# Patient Record
Sex: MALE | Race: BLACK OR AFRICAN AMERICAN | NOT HISPANIC OR LATINO | Employment: STUDENT | URBAN - METROPOLITAN AREA
[De-identification: names, ages, dates, MRNs, and addresses within clinical notes are randomized per-mention and may not be internally consistent; named-entity substitution may affect disease eponyms.]

---

## 2017-04-13 ENCOUNTER — TELEPHONE (OUTPATIENT)
Dept: ORTHOPEDICS | Facility: CLINIC | Age: 22
End: 2017-04-13

## 2017-04-13 NOTE — TELEPHONE ENCOUNTER
----- Message from Eren Radford sent at 4/13/2017 10:49 AM CDT -----  Contact: Mrs. Navas  Please call regarding being worked into the schedule for an appt today if possible. Pt's mother states he fractured his ankle and is in a lot of pain. Please call back @ ..[phone] (home) Thank you/NH

## 2017-04-18 ENCOUNTER — HOSPITAL ENCOUNTER (OUTPATIENT)
Dept: RADIOLOGY | Facility: HOSPITAL | Age: 22
Discharge: HOME | End: 2017-04-18
Attending: ORTHOPAEDIC SURGERY
Payer: MEDICAID

## 2017-04-18 ENCOUNTER — OFFICE VISIT (OUTPATIENT)
Dept: ORTHOPEDICS | Facility: CLINIC | Age: 22
End: 2017-04-18
Payer: MEDICAID

## 2017-04-18 VITALS
WEIGHT: 145.94 LBS | BODY MASS INDEX: 20.43 KG/M2 | SYSTOLIC BLOOD PRESSURE: 139 MMHG | HEART RATE: 63 BPM | HEIGHT: 71 IN | DIASTOLIC BLOOD PRESSURE: 83 MMHG

## 2017-04-18 DIAGNOSIS — M25.572 LEFT ANKLE PAIN, UNSPECIFIED CHRONICITY: Primary | ICD-10-CM

## 2017-04-18 DIAGNOSIS — S93.402A SPRAIN OF LEFT ANKLE, UNSPECIFIED LIGAMENT, INITIAL ENCOUNTER: Primary | ICD-10-CM

## 2017-04-18 DIAGNOSIS — M25.572 LEFT ANKLE PAIN, UNSPECIFIED CHRONICITY: ICD-10-CM
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
## 2017-04-18 PROCEDURE — 99213 OFFICE O/P EST LOW 20 MIN: CPT | Mod: PBBFAC,25 | Performed by: PHYSICIAN ASSISTANT

## 2017-04-18 PROCEDURE — 29515 APPLICATION SHORT LEG SPLINT: CPT | Mod: PBBFAC | Performed by: PHYSICIAN ASSISTANT

## 2017-04-18 PROCEDURE — 99214 OFFICE O/P EST MOD 30 MIN: CPT | Mod: 25,S$PBB,, | Performed by: PHYSICIAN ASSISTANT

## 2017-04-18 PROCEDURE — 99999 PR PBB SHADOW E&M-EST. PATIENT-LVL III: CPT | Mod: PBBFAC,,, | Performed by: PHYSICIAN ASSISTANT

## 2017-04-18 PROCEDURE — 73610 X-RAY EXAM OF ANKLE: CPT | Mod: 26,LT,, | Performed by: RADIOLOGY

## 2017-04-18 PROCEDURE — 29515 APPLICATION SHORT LEG SPLINT: CPT | Mod: S$PBB,,, | Performed by: PHYSICIAN ASSISTANT

## 2017-04-18 RX ORDER — IBUPROFEN 600 MG/1
600 TABLET ORAL 3 TIMES DAILY
Qty: 40 TABLET | Refills: 1 | Status: SHIPPED | OUTPATIENT
Start: 2017-04-18

## 2017-04-18 NOTE — PROGRESS NOTES
Subjective:      Patient ID: Kayy Navas is a 21 y.o. male.    Chief Complaint: Injury of the Left Ankle    HPI Comments: Body part: L ankle    Occupation: Student at [employer]    Dominant hand: --    Referred by: No ref. provider found    Date of Injury: 4/12/17    Patient's visit goal: To determine what is going on in his ankle    Problem Description: Patient was playing basketball and another player ran in front of him which caused him to roll his left ankle.  He had immediate swelling and discomfort.  He was evaluated [facility].  He was placed into a cam walker boot and given crutches.  He had significant pain with the boot and so he was evaluated at the [facility].  He was placed into a posterior mold splint and is here today for follow-up.    He continues to have discomfort and swelling of the ankle.  He is using tramadol and ibuprofen for pain relief.  There is no persistent numbness or tingling.          Injury   This is a new problem. Episode onset: 04/12/2017. The problem occurs intermittently. The problem has been gradually worsening. Associated symptoms include joint swelling. The symptoms are aggravated by exertion. He has tried immobilization for the symptoms. The treatment provided no relief.       Review of Systems   Musculoskeletal: Positive for joint pain and joint swelling.         Objective:            General    Nursing note and vitals reviewed.  Constitutional: He is oriented to person, place, and time. He appears well-developed and well-nourished. No distress.   Neck: Normal range of motion.   Neurological: He is alert and oriented to person, place, and time.   Psychiatric: He has a normal mood and affect. His behavior is normal. Judgment and thought content normal.         Left Ankle/Foot Exam     Inspection  Bruising: Ankle - present Foot - present  Effusion: Ankle - present Foot - present    Range of Motion   Nelson Test:  normal  First MTP  Joint: normal    Other   Sensation: normal    Comments:  Intact motion and strength of the ankle in all 4 quadrants. No extreme motion or strength testing because of the injury.      Limited toe motion. TTP along the lateral, medial, and anterolateral ankle. Swelling with bruising along the medial and lateral ankle as well.     Pt very guarding.       Left Knee Exam     Inspection   Swelling: absent  Effusion: absent    Range of Motion   Extension: normal   Flexion: normal     Other   Sensation: normal    Muscle Strength   Right Lower Extremity   EHL:  5/5  Left Lower Extremity   Hip Abduction: 5/5   Quadriceps:  4/5 and 5/5   Hamstrin/5 and 4/5   FDL: 5/5  EDL: 5/5  FHL: 5/5    Vascular Exam       Left Pulses  Dorsalis Pedis:      1+  Posterior Tibial:      1+        Edema  Left Lower Leg: present    I have reviewed the films and report. I agree with the radiologist interpretation of the radiographic findings:  There is significant soft tissue swelling noted laterally.  If there is an underlying fracture in this region is not well demonstrated.  There is bandaging material seen overlying the ankle which limits evaluation.  There is a very tiny fragment of bone seen dorsal to the calcaneus.  A subtle talar avulsion fracture cannot be excluded.         Assessment:       Encounter Diagnoses   Name Primary?    Sprain of left ankle, unspecified ligament, initial encounter Yes    Basketball activities           Plan:       Kayy was seen today for injury.    Diagnoses and all orders for this visit:    Sprain of left ankle, unspecified ligament, initial encounter  -     MRI Lower Extremity Joint WO Cont Left; Future    Basketball activities    Other orders  -     ibuprofen (ADVIL,MOTRIN) 600 MG tablet; Take 1 tablet (600 mg total) by mouth 3 (three) times daily.      Proceed with MRI of the left ankle to assess for internal derangement.  He has physical exam findings consistent with medial and lateral ankle  involvement.  He will go into a splint today.  He should keep it clean, dry, and intact.  He should also elevate through the day and night.  We will see him after the MRI is completed to discuss the findings and treatment options may include casting.  We have renewed his on her pro-prescription.  He can alternate this with Tylenol.  His mother was present throughout the examination and discussion.    The patient agrees to move forward with splint placement. Inspection of the leg, ankle, and foot demonstrated normal alignment with intact skin. Stockinette and cotton cast padding was applied in the form of a posterior mold splint. This was followed by ortho-glass splinting material. Upon setting of the splint, the patient had intact sensation at all dermatomes visible distally with a two second capillary refill that was appropriate. Pt said the splint was warm, comfortable, and not too tight. Splinting care was verbally given. Pt left content and without questions.

## 2017-04-18 NOTE — PROGRESS NOTES
Subjective:      Patient ID: Kayy Navas is a 21 y.o. male.    Chief Complaint: Pain and Injury of the Left Ankle    HPI    ROS      Objective:            Ortho/SPM Exam            Assessment:       No diagnosis found.       Plan:       There are no diagnoses linked to this encounter.

## 2017-04-18 NOTE — PROGRESS NOTES
Subjective:      Patient ID: Kayy Navas is a 21 y.o. male.    Chief Complaint: Injury and Pain of the Left Ankle    HPI    ROS      Objective:            Ortho/SPM Exam            Assessment:       No diagnosis found.       Plan:       There are no diagnoses linked to this encounter.

## 2017-04-26 ENCOUNTER — HOSPITAL ENCOUNTER (OUTPATIENT)
Dept: RADIOLOGY | Facility: HOSPITAL | Age: 22
Discharge: HOME | End: 2017-04-26
Attending: ORTHOPAEDIC SURGERY
Payer: MEDICAID

## 2017-04-26 DIAGNOSIS — S93.402A SPRAIN OF LEFT ANKLE, UNSPECIFIED LIGAMENT, INITIAL ENCOUNTER: ICD-10-CM

## 2017-04-26 PROCEDURE — 73721 MRI JNT OF LWR EXTRE W/O DYE: CPT | Mod: TC,LT

## 2017-04-27 ENCOUNTER — OFFICE VISIT (OUTPATIENT)
Dept: ORTHOPEDICS | Facility: CLINIC | Age: 22
End: 2017-04-27
Payer: MEDICAID

## 2017-04-27 VITALS
BODY MASS INDEX: 20.43 KG/M2 | DIASTOLIC BLOOD PRESSURE: 73 MMHG | HEART RATE: 82 BPM | WEIGHT: 145.94 LBS | SYSTOLIC BLOOD PRESSURE: 131 MMHG | HEIGHT: 71 IN

## 2017-04-27 DIAGNOSIS — S96.892D: ICD-10-CM

## 2017-04-27 PROCEDURE — 99999 PR PBB SHADOW E&M-EST. PATIENT-LVL III: CPT | Mod: PBBFAC,,, | Performed by: PHYSICIAN ASSISTANT

## 2017-04-27 PROCEDURE — 99213 OFFICE O/P EST LOW 20 MIN: CPT | Mod: PBBFAC | Performed by: PHYSICIAN ASSISTANT

## 2017-04-27 PROCEDURE — 99214 OFFICE O/P EST MOD 30 MIN: CPT | Mod: 25,S$PBB,, | Performed by: PHYSICIAN ASSISTANT

## 2017-04-27 NOTE — PROGRESS NOTES
Subjective:      Patient ID: Kayy Navas is a 21 y.o. male.    Chief Complaint: Injury of the Left Ankle    HPI Comments: Body part: L ankle    Occupation: Student at [employer]    Dominant hand: --    Referred by: No ref. provider found    Date of Injury: 4/12/17    Patient's visit goal: To determine what is going on in his ankle    Problem Description: Patient was playing basketball and another player ran in front of him which caused him to roll his left ankle.  He had immediate swelling and discomfort.  He was evaluated at the [facility].  He was placed into a cam walker boot and given crutches.  He had significant pain with the boot and so he was evaluated at the [facility].  He was placed into a posterior mold splint and is here today for follow-up.    He has been stable since seeing me last.  He's here to review MRI results.  He has been keeping the splint on most of the time, but does admit to removing it intermittently.  His mother is present today.  Swelling is improved as is his discomfort.          Injury   This is a new problem. Episode onset: 04/12/2017. The problem occurs intermittently. The problem has been gradually improving. Associated symptoms include joint swelling. Pertinent negatives include no abdominal pain, chest pain, chills, congestion, coughing, fever, nausea, numbness, rash or vomiting. The symptoms are aggravated by exertion. He has tried immobilization (Splint) for the symptoms. The treatment provided mild relief.       Review of Systems   Constitution: Negative for chills, fever and weight loss.   HENT: Negative for congestion and hearing loss.    Eyes: Negative for double vision and pain.   Cardiovascular: Negative for chest pain and irregular heartbeat.   Respiratory: Negative for cough and shortness of breath.    Endocrine: Negative for polyuria.   Hematologic/Lymphatic: Does not bruise/bleed easily.   Skin: Negative for poor wound healing, rash  and suspicious lesions.   Musculoskeletal: Positive for joint pain and joint swelling. Negative for arthritis.   Gastrointestinal: Negative for abdominal pain, nausea and vomiting.   Genitourinary: Negative for bladder incontinence and frequency.   Neurological: Negative for loss of balance, numbness, paresthesias, sensory change and tremors.   Psychiatric/Behavioral: Negative for depression. The patient is not nervous/anxious.    Allergic/Immunologic: Negative for hives.         Objective:            General    Nursing note and vitals reviewed.  Constitutional: He is oriented to person, place, and time. He appears well-developed and well-nourished. No distress.   Neurological: He is alert and oriented to person, place, and time.   Psychiatric: He has a normal mood and affect. His behavior is normal. Judgment and thought content normal.                 I have reviewed the films and report. I agree with the radiologist interpretation of the radiographic findings:  Grade 3 ATFL, calcaneofibular and deltoid ligament tears.    Low-grade partial-thickness or posterior tibialis tendon tear      Assessment:       Encounter Diagnoses   Name Primary?    Grade 3 ankle sprain, left, subsequent encounter Yes    Other specified injury of other specified muscles and tendons at ankle and foot level, left foot, subsequent encounter           Plan:       Kayy was seen today for injury.    Diagnoses and all orders for this visit:    Grade 3 ankle sprain, left, subsequent encounter    Other specified injury of other specified muscles and tendons at ankle and foot level, left foot, subsequent encounter    He will go into a cast for 6 weeks duration.  We'll see him in 1 week for cast check.  No x-rays are necessary.  He should keep the cast clean, dry, and intact at all times.  He should elevate through the day as well as at night.  He and his mother were understanding of the treatment plan.    The patient agrees to move forward  with cast placement. Inspection of the leg, ankle, and foot demonstrated normal alignment with intact skin. Stockinette and cotton cast padding was applied in the form of a short leg cast. This was followed by fiberglass casting material. Upon setting of the cast, the patient had intact sensation at all dermatomes visible distally with a two second capillary refill that was appropriate. Pt said the cast was warm, comfortable, and not too tight. Casting care was verbally given. Pt left content and without questions.    The patient understands, chooses and consents to this plan and accepts all   the risks which include but are not limited to the risks mentioned above.   Pt understands the alternative of having no testing, intervention or       treatment at this time. Pt left content and without questions.     Disclaimer: This note was prepared using a voice recognition system and is likely to have sound alike errors within the text.

## 2017-05-04 ENCOUNTER — OFFICE VISIT (OUTPATIENT)
Dept: ORTHOPEDICS | Facility: CLINIC | Age: 22
End: 2017-05-04
Payer: MEDICAID

## 2017-05-04 VITALS
HEART RATE: 63 BPM | WEIGHT: 155 LBS | DIASTOLIC BLOOD PRESSURE: 81 MMHG | HEIGHT: 71 IN | BODY MASS INDEX: 21.7 KG/M2 | SYSTOLIC BLOOD PRESSURE: 146 MMHG

## 2017-05-04 DIAGNOSIS — S96.892D: ICD-10-CM

## 2017-05-04 PROCEDURE — 99999 PR PBB SHADOW E&M-EST. PATIENT-LVL III: CPT | Mod: PBBFAC,,, | Performed by: PHYSICIAN ASSISTANT

## 2017-05-04 PROCEDURE — 99213 OFFICE O/P EST LOW 20 MIN: CPT | Mod: PBBFAC | Performed by: PHYSICIAN ASSISTANT

## 2017-05-04 PROCEDURE — 99213 OFFICE O/P EST LOW 20 MIN: CPT | Mod: S$PBB,,, | Performed by: PHYSICIAN ASSISTANT

## 2017-05-04 NOTE — PROGRESS NOTES
Subjective:      Patient ID: Kayy Navas is a 21 y.o. male.    Chief Complaint: Injury of the Left Ankle    HPI Comments: Body part: L ankle    Occupation: Student at [employer]    Dominant hand: --    Referred by: No ref. provider found    Date of Injury: 4/12/17    Patient's visit goal: To determine what is going on in his ankle    Problem Description: Patient was playing basketball and another player ran in front of him which caused him to roll his left ankle.  He had immediate swelling and discomfort.  He was evaluated at the [facility].  He was placed into a cam walker boot and given crutches.  He had significant pain with the boot and so he was evaluated at the [facility].  He was placed into a posterior mold splint and is here today for follow-up.    He has been stable since seeing me last.  His mother is present today.  Swelling is improved as is his discomfort.  Very few medications since seeing me last.  He is stable in the short leg cast and is here today for recheck.          Injury   This is a new problem. Episode onset: 04/12/2017. The problem occurs intermittently. The problem has been gradually improving. Associated symptoms include joint swelling. Pertinent negatives include no abdominal pain, chest pain, chills, congestion, coughing, fever, nausea, numbness, rash or vomiting. The symptoms are aggravated by exertion. He has tried immobilization (Splint) for the symptoms. The treatment provided mild relief.       Review of Systems   Constitution: Negative for chills, fever and weight loss.   HENT: Negative for congestion and hearing loss.    Eyes: Negative for double vision and pain.   Cardiovascular: Negative for chest pain and irregular heartbeat.   Respiratory: Negative for cough and shortness of breath.    Endocrine: Negative for polyuria.   Hematologic/Lymphatic: Does not bruise/bleed easily.   Skin: Negative for poor wound healing, rash and suspicious  lesions.   Musculoskeletal: Positive for joint pain and joint swelling. Negative for arthritis.   Gastrointestinal: Negative for abdominal pain, nausea and vomiting.   Genitourinary: Negative for bladder incontinence and frequency.   Neurological: Negative for loss of balance, numbness, paresthesias, sensory change and tremors.   Psychiatric/Behavioral: Negative for depression. The patient is not nervous/anxious.    Allergic/Immunologic: Negative for hives.         Objective:            General    Nursing note and vitals reviewed.  Constitutional: He is oriented to person, place, and time. He appears well-developed and well-nourished. No distress.   Neurological: He is alert and oriented to person, place, and time.   Psychiatric: He has a normal mood and affect. His behavior is normal. Judgment and thought content normal.         Left Ankle/Foot Exam     Comments:  Short leg cast is in place.  He has been nonweightbearing and using crutches.  No signs of wear or tear.  It is clean, dry, and intact.  Decreased swelling distally.  Good function of the toes and the knee.                Assessment:       Encounter Diagnoses   Name Primary?    Grade 3 ankle sprain, left, subsequent encounter Yes    Other specified injury of other specified muscles and tendons at ankle and foot level, left foot, subsequent encounter           Plan:       Kayy was seen today for injury.    Diagnoses and all orders for this visit:    Grade 3 ankle sprain, left, subsequent encounter    Other specified injury of other specified muscles and tendons at ankle and foot level, left foot, subsequent encounter   he is doing well in the short leg cast.  He should continue to keep it clean, dry, and intact.  He will follow-up in 3 weeks for cast removal and injury check without x-ray.       The patient understands, chooses and consents to this plan and accepts all   the risks which include but are not limited to the risks mentioned above.   Pt  understands the alternative of having no testing, intervention or       treatment at this time. Pt left content and without questions.     Disclaimer: This note was prepared using a voice recognition system and is likely to have sound alike errors within the text.

## 2017-05-25 ENCOUNTER — OFFICE VISIT (OUTPATIENT)
Dept: ORTHOPEDICS | Facility: CLINIC | Age: 22
End: 2017-05-25
Payer: MEDICAID

## 2017-05-25 VITALS
WEIGHT: 155 LBS | DIASTOLIC BLOOD PRESSURE: 74 MMHG | HEART RATE: 54 BPM | SYSTOLIC BLOOD PRESSURE: 135 MMHG | BODY MASS INDEX: 21.7 KG/M2 | HEIGHT: 71 IN
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
## 2017-05-25 PROCEDURE — 99213 OFFICE O/P EST LOW 20 MIN: CPT | Mod: PBBFAC | Performed by: PHYSICIAN ASSISTANT

## 2017-05-25 PROCEDURE — 99213 OFFICE O/P EST LOW 20 MIN: CPT | Mod: S$PBB,,, | Performed by: PHYSICIAN ASSISTANT

## 2017-05-25 PROCEDURE — 99999 PR PBB SHADOW E&M-EST. PATIENT-LVL III: CPT | Mod: PBBFAC,,, | Performed by: PHYSICIAN ASSISTANT

## 2017-05-25 NOTE — PROGRESS NOTES
Subjective:      Patient ID: Kayy Navas is a 21 y.o. male.    Chief Complaint: Injury of the Left Ankle    Body part: Left Ankle    Occupation: Student at [employer]    Dominant hand: Right    Referred by: No ref. provider found    Date of Injury: 4/12/17    Patient's visit goal: To determine what is going on in his ankle    Problem Description: Patient was playing basketball and another player ran in front of him which caused him to roll his left ankle.  He had immediate swelling and discomfort.  He was evaluated at the [facility].  He was placed into a cam walker boot and given crutches.  He had significant pain with the boot and so he was evaluated at the [facility].  He was placed into a posterior mold splint and is here today for follow-up.    He has been stable since seeing me last.  His mother is present today.  He has been walking on the cast.  He has no new complaints.  He is here today for cast off and reevaluation.          Injury   This is a new problem. Episode onset: 04/12/2017. The problem occurs intermittently. The problem has been gradually improving. Associated symptoms include joint swelling. Pertinent negatives include no abdominal pain, chest pain, chills, congestion, coughing, fever, nausea, numbness, rash or vomiting. The symptoms are aggravated by exertion. He has tried immobilization (Splint) for the symptoms. The treatment provided mild relief.       Review of Systems   Constitution: Negative for chills, fever and weight loss.   HENT: Negative for congestion and hearing loss.    Eyes: Negative for double vision and pain.   Cardiovascular: Negative for chest pain and irregular heartbeat.   Respiratory: Negative for cough and shortness of breath.    Endocrine: Negative for polyuria.   Hematologic/Lymphatic: Does not bruise/bleed easily.   Skin: Negative for poor wound healing, rash and suspicious lesions.   Musculoskeletal: Positive for joint pain and  joint swelling. Negative for arthritis.   Gastrointestinal: Negative for abdominal pain, nausea and vomiting.   Genitourinary: Negative for bladder incontinence and frequency.   Neurological: Negative for loss of balance, numbness, paresthesias, sensory change and tremors.   Psychiatric/Behavioral: Negative for depression. The patient is not nervous/anxious.    Allergic/Immunologic: Negative for hives.         Objective:            General    Nursing note and vitals reviewed.  Constitutional: He is oriented to person, place, and time. He appears well-developed and well-nourished. No distress.   Neck: Normal range of motion.   Neurological: He is alert and oriented to person, place, and time.   Psychiatric: He has a normal mood and affect. His behavior is normal. Judgment and thought content normal.         Left Ankle/Foot Exam     Inspection  Bruising: Ankle - absent Foot - absent  Effusion: Ankle - absent Foot - absent    Range of Motion   Nelson Test:  normal  First MTP Joint: normal    Other   Sensation: normal    Comments:  Intact motion and strength of the ankle in all 4 quadrants.     Significantly improved swelling.  He is hesitant to perform range of motion.  No tenderness noted today.      Left Knee Exam     Inspection   Swelling: absent  Effusion: absent    Range of Motion   Extension: normal   Flexion: normal     Other   Sensation: normal    Muscle Strength   Right Lower Extremity   EHL:  5/5  Left Lower Extremity   Hip Abduction: 5/5   Quadriceps:  4/5 and 5/5   Hamstrin/5 and 4/5   FDL: 5/5  EDL: 5/5  FHL: 5/5    Vascular Exam       Left Pulses  Dorsalis Pedis:      2+  Posterior Tibial:      2+        Edema  Left Lower Leg: absent              Assessment:       Encounter Diagnoses   Name Primary?    Grade 3 ankle sprain, left, subsequent encounter Yes    Basketball activities           Plan:       Kayy was seen today for injury.    Diagnoses and all orders for this visit:    Grade 3 ankle  sprain, left, subsequent encounter    Basketball activities      He will go into his tall walking boot,  working up to weightbearing as tolerated over the next 10 days.  After that time, he should ambulate for the next 2 weeks in the boot.  He will follow up with our facility in 3 weeks for reevaluation.  At that time, anticipate he will go into a lace up ankle brace and start an outpatient therapy program.  He is doing well for now.    The patient understands, chooses and consents to this plan and accepts all   the risks which include but are not limited to the risks mentioned above.   Pt understands the alternative of having no testing, intervention or       treatment at this time. Pt left content and without questions.     Disclaimer: This note was prepared using a voice recognition system and is likely to have sound alike errors within the text.

## 2017-06-16 ENCOUNTER — OFFICE VISIT (OUTPATIENT)
Dept: ORTHOPEDICS | Facility: CLINIC | Age: 22
End: 2017-06-16
Payer: MEDICAID

## 2017-06-16 VITALS
DIASTOLIC BLOOD PRESSURE: 74 MMHG | HEART RATE: 62 BPM | BODY MASS INDEX: 21.7 KG/M2 | HEIGHT: 71 IN | WEIGHT: 155 LBS | SYSTOLIC BLOOD PRESSURE: 130 MMHG

## 2017-06-16 PROCEDURE — 99213 OFFICE O/P EST LOW 20 MIN: CPT | Mod: PBBFAC | Performed by: PHYSICIAN ASSISTANT

## 2017-06-16 PROCEDURE — 99213 OFFICE O/P EST LOW 20 MIN: CPT | Mod: S$PBB,,, | Performed by: PHYSICIAN ASSISTANT

## 2017-06-16 PROCEDURE — 99999 PR PBB SHADOW E&M-EST. PATIENT-LVL III: CPT | Mod: PBBFAC,,, | Performed by: PHYSICIAN ASSISTANT

## 2017-06-16 NOTE — LETTER
June 16, 2017      Ankit Hopkins MD  [address] [place] [address]           [facility]  [address] [place] [id number]  Phone: [phone]  Fax: [phone]          Patient: Kayy Navas   MR Number: [id number]   YOB: 1995   Date of Visit: 6/16/2017       Dear Dr. Ankit Hopkins:    Thank you for referring Kayy Navas to me for evaluation. Attached you will find relevant portions of my assessment and plan of care.    If you have questions, please do not hesitate to call me. I look forward to following Kayy Navas along with you.    Sincerely,    Luz Elena Nathan PA-C    Enclosure  CC:  No Recipients    If you would like to receive this communication electronically, please contact [email or web address] or [phone] to request more information on [employer] Link access.    For providers and/or their staff who would like to refer a patient to [facility], please contact us through our one-stop-shop provider referral line, [facility], at [phone].    If you feel you have received this communication in error or would no longer like to receive these types of communications, please e-mail [email or web address]

## 2017-06-16 NOTE — PROGRESS NOTES
"Subjective:      Patient ID: Kayy Navas is a 21 y.o. male.    Chief Complaint: Injury and Pain of the Left Ankle    HPI  The patient is seen today for follow-up of left ankle discomfort.  He was previously seen by Ramon Land.    The patient states that he injured his ankle while playing basketball at school almost 3 months ago. He tripped over another player's foot and his ankle "rolled on both sides."  He has been wearing his boot somewhat however if he doesn't wear it, he limps. The patient states that his ankle feels weak and unstable. He has no pain. He requests to wear his CAM boot for some more time.           Review of Systems   Constitution: Negative for chills, fever and night sweats.   Respiratory: Negative for cough, shortness of breath and wheezing.    Musculoskeletal: Positive for stiffness. Negative for joint pain and joint swelling.   Gastrointestinal: Negative for diarrhea, nausea and vomiting.   Neurological: Negative for brief paralysis.   Psychiatric/Behavioral: Negative for altered mental status.         Objective:            General    Constitutional: He is oriented to person, place, and time. He appears well-developed and well-nourished.   Neck: Normal range of motion.   Cardiovascular: Normal rate and regular rhythm.    Pulmonary/Chest: Effort normal.   Abdominal: Soft.   Neurological: He is alert and oriented to person, place, and time.   Psychiatric: He has a normal mood and affect. His behavior is normal.     General Musculoskeletal Exam   Gait: abnormal     Left Ankle/Foot Exam     Range of Motion   Ankle Joint  Dorsiflexion: normal   Plantar flexion: normal     Subtalar Joint   Inversion: abnormal   Eversion: abnormal     Alignment   Midfoot Alignment: normal  Forefoot Alignment: normal    Tests   Anterior drawer: negative  Heel Walk: able to perform  Tiptoe Walk: able to perform  External Rotation Test: negative  Squeeze Test: absent    Other   Sensation: normal  Peroneal " Subluxation: negative    Comments:  No pain or tenderness.                       Assessment:       Encounter Diagnosis   Name Primary?    Grade 3 ankle sprain, left, subsequent encounter Yes          Plan:       Kayy was seen today for injury and pain.    Diagnoses and all orders for this visit:    Grade 3 ankle sprain, left, subsequent encounter          Patient will continue with at-home exercises and will be given a referral to start outpatient therapy twice a week at the [facility] location.    I feel that the patient can come out of his cam walking boot however he feels that he needs to wear it little bit longer since he has to climb stairs at his home.  I would like the patient to eventually come out of his boot and transition to normal shoewear before his next visit.  Patient will return for what may be a final check in 5 weeks.        Luz Elena Nathan PA-C

## 2017-06-21 ENCOUNTER — CLINICAL SUPPORT (OUTPATIENT)
Dept: REHABILITATION | Facility: HOSPITAL | Age: 22
End: 2017-06-21
Attending: ORTHOPAEDIC SURGERY
Payer: MEDICAID

## 2017-06-21 PROCEDURE — 97161 PT EVAL LOW COMPLEX 20 MIN: CPT | Performed by: PHYSICAL THERAPIST

## 2017-06-21 NOTE — PROGRESS NOTES
PHYSICAL THERAPY INITIAL OUTPATIENT EVALUATION    Referring Provider:  Dr. Benigno Moses    Diagnosis:       ICD-10-CM ICD-9-CM    1. Grade 3 ankle sprain, left, subsequent encounter S93.402D V58.89      845.00             Orders:  Evaluate and Treat    Date of Initial Evaluation: 17    Orders : 17      Visit # 1    SUBJECTIVE:  Patient reports injury on 17 while he was playing basketball.  Another player ran in front of him while he was going to shoot the ball which caused him to fall and roll his L ankle.  He was seen in the ER at [facility] due to swelling and pain and at that time he was put in a walking boot.  He states this was too painful and then was see at [facility] with a posterior splint put on the foot.  He saw orthopedics here at [facility] on 17 for follow-up.  He had a MRI done on  and again saw orthopedics on  with placement of a cast on the L LE with NWB orders due to Grade 3 ATFL, calcaneofibular and deltoid ligament tears as well as low-grade partial-thickness of posterior tibialis tendon tear.  He had the cast removed on 17 with use of boot after this.  He presents today with stiffness and states pain after getting up from sitting for long periods.  Yestreday he went to his cousins house without his boot on with just tennis shoes and had increased pain after sitting for long time.  He presents today without boot on the foot.      PAST MEDICAL HISTORY:    Past Medical History:   Diagnosis Date    Asthma        CURRENT MEDICATIONS:    Current Outpatient Prescriptions:     ALBUTEROL, REFILL, INHL, Inhale into the lungs., Disp: , Rfl:     fluticasone-salmeterol 100-50 mcg/dose (ADVAIR) 100-50 mcg/dose diskus inhaler, Inhale 1 puff into the lungs 2 (two) times daily., Disp: , Rfl:     hyoscyamine (ANASPAZ,LEVSIN) 0.125 mg Tab, Take 1 tablet (125 mcg total) by mouth every 6 (six) hours as needed (abdominal cramping)., Disp: 15 tablet, Rfl: 0    ibuprofen (ADVIL,MOTRIN) 600  MG tablet, Take 1 tablet (600 mg total) by mouth 3 (three) times daily., Disp: 40 tablet, Rfl: 1    OBJECTIVE:  Pain: 5-6 /10, located anterior/middle ankle, described as sore/achy    Sensation: intact to light touch     Ankle ROM:  Plantar Flexion  Left  20 to 30 degrees  Right 60 degrees        Dorsi Flexion   Left -5 to 5 degrees   Right 10 degrees      Inversion   Left 5 to 15 degrees  Right 20 degrees       Eversion   Left       Strength:  Anterior tibialis   Left 3+   Right 4+      Posterior tibialis  Left 3+   Right 4+     Gastrocnemius  Left 3   Right 4+      Soleus    Left 3   Right 4+     Peroneals   Left 3+   Right 4+       Hamstrings    Left 4-   Right 5-     Quadriceps   Left 4-   Right 4+     Iliopsoas   Left 3+   Right 4-     Gluteus Medius  Left 3+   Right 4+     Gluteus Charlie  Left 3+   Right 4      Girth:   Malleoli    Left 25.5 cm   Right 25 cm      Figure 8    Left 52.5 cm   Right 50.5 cm            Function:  LOWER EXTREMITY FUNCTIONAL SCALE                EVAL    1. Any of your usual work, housework or school activities   2/4  2. Your usual hobbies, sporting     0/4  3. Getting in and out of tub      2/4  4. Walking between rooms      2/4  5. Putting on shoes or socks      1/4  6. Squatting        0/4  7. Lifting an object from the ground      2/4  8. Performing light activities around the home   2/4  9. Performing heavy activities around the home   0/4  10. Getting in and out of car      2/4  11. Walking 2 blocks       2/4  12.Walking a mile       0/4  13. Getting up and down 1 flight of stairs    1/4  14. Standing for 1 hour      0/4  15. Sitting for an hour       1/4  16. Running on even ground      0/4  17. Running on uneven ground     0/4  18. Making sharp turns when running fast    0/4  19. Hopping        1/4  20. Rolling over in bed       4/4          Total: 22/80         Patient reports 72.5% disability based on score of the Lower Extremity Functional Scale.    Tenderness to  palpation:  Anterior and medial L ankle    Other: SLS L LE 2 seconds, R LE 22 seconds     ASSESSMENT:  The patient is a 21 y.o. year old male who presents to physical therapy with complaints of L ankle pain.  Patient's impairments include decreased L LE strength in the ankle, knee and hip, decreased L ankle ROM, and L ankle swelling.  Patient also limited with balance and SLS time on the L LE compared to the R LE  These impairments are limiting patient's ability to participate in sports and recreational activities as well as increased standing and walking.  Patient's prognosis is good to meet the following goals.  Patient will benefit from skilled physical therapy intervention to address the above listed deficits and below listed goals to increase patient's functional mobility and stability.    Short Term Goals:    1.  Patient will be educated on HEP and report compliance 2 days per week.  2.  Patient will increase strength of the L LE by 1/3 MMT grade to increase functional stability.  3.  Patient will decrease LEFS disability to less than 55% to evidence increased functional mobility.  4.  Patient will have decreased swelling in the L ankle to = that of the R ankle to increase functional mobility.    Long Term Goals:  1.  Patient will increase strength of the L LE by 1/2 MMT grade to increase functional stability.  2.  Patient will decrease LEFS disability to less than 40% to evidence increased functional mobility.  3.  Patient will increase L LE ROM to WFL to increase functional mobility.    Co-morbidities which may impact the plan of care and potentially impede the patient's progress in therapy include: NA    The patient's clinical presentation is stable.    TREATMENT PROVIDED:  -Initial evaluation completed.      PLAN:  Patient will benefit from physical therapy (2) x/week for (8) weeks including manual therapy, therapeutic exercise, functional activities, modalities, and patient education.    Thank you for  this referral.    These services are reasonable and necessary for the conditions set forth above while under my care.

## 2017-06-26 ENCOUNTER — CLINICAL SUPPORT (OUTPATIENT)
Dept: REHABILITATION | Facility: HOSPITAL | Age: 22
End: 2017-06-26
Attending: ORTHOPAEDIC SURGERY
Payer: MEDICAID

## 2017-06-26 PROCEDURE — 97035 APP MDLTY 1+ULTRASOUND EA 15: CPT | Performed by: PHYSICAL THERAPIST

## 2017-06-26 PROCEDURE — 97140 MANUAL THERAPY 1/> REGIONS: CPT | Performed by: PHYSICAL THERAPIST

## 2017-06-26 PROCEDURE — 97110 THERAPEUTIC EXERCISES: CPT | Performed by: PHYSICAL THERAPIST

## 2017-06-26 NOTE — PROGRESS NOTES
PHYSICAL THERAPY DAILY PROGRESS NOTE     Referring Provider:  Dr. Benigno Moses    Diagnosis:       ICD-10-CM ICD-9-CM    1. Grade 3 ankle sprain, left, subsequent encounter S93.402D V58.89      845.00           Orders:  Evaluate and Treat    Date of Initial Evaluation: 17    Orders : 17    Authorization expires: 17    Visit # 2 (1 of 16 authorized)    SUBJECTIVE:  Patient reports the ankle is stiff today as he hasn't been up long and hasn't moved a lot this morning.      Initial:  Patient reports injury on 17 while he was playing basketball.  Another player ran in front of him while he was going to shoot the ball which caused him to fall and roll his L ankle.  He was seen in the ER at [facility] due to swelling and pain and at that time he was put in a walking boot.  He states this was too painful and then was see at [facility] with a posterior splint put on the foot.  He saw orthopedics here at [facility] on 17 for follow-up.  He had a MRI done on  and again saw orthopedics on  with placement of a cast on the L LE with NWB orders due to Grade 3 ATFL, calcaneofibular and deltoid ligament tears as well as low-grade partial-thickness of posterior tibialis tendon tear.  He had the cast removed on 17 with use of boot after this.  He presents today with stiffness and states pain after getting up from sitting for long periods.  Yestreday he went to his cousins house without his boot on with just tennis shoes and had increased pain after sitting for long time.  He presents today without boot on the foot.      PAST MEDICAL HISTORY:    Past Medical History:   Diagnosis Date    Asthma        CURRENT MEDICATIONS:    Current Outpatient Prescriptions:     ALBUTEROL, REFILL, INHL, Inhale into the lungs., Disp: , Rfl:     fluticasone-salmeterol 100-50 mcg/dose (ADVAIR) 100-50 mcg/dose diskus inhaler, Inhale 1 puff into the lungs 2 (two) times daily., Disp: , Rfl:     hyoscyamine (ANASPAZ,LEVSIN)  0.125 mg Tab, Take 1 tablet (125 mcg total) by mouth every 6 (six) hours as needed (abdominal cramping)., Disp: 15 tablet, Rfl: 0    ibuprofen (ADVIL,MOTRIN) 600 MG tablet, Take 1 tablet (600 mg total) by mouth 3 (three) times daily., Disp: 40 tablet, Rfl: 1    OBJECTIVE:  Pain: 5-6 /10, located anterior/middle ankle, described as sore/achy    Sensation: intact to light touch     Ankle ROM:  Plantar Flexion  Left  20 to 30 degrees  Right 60 degrees        Dorsi Flexion   Left -5 to 5 degrees   Right 10 degrees      Inversion   Left 5 to 15 degrees  Right 20 degrees       Eversion   Left       Strength:  Anterior tibialis   Left 3+   Right 4+      Posterior tibialis  Left 3+   Right 4+     Gastrocnemius  Left 3   Right 4+      Soleus    Left 3   Right 4+     Peroneals   Left 3+   Right 4+       Hamstrings    Left 4-   Right 5-     Quadriceps   Left 4-   Right 4+     Iliopsoas   Left 3+   Right 4-     Gluteus Medius  Left 3+   Right 4+     Gluteus Charlie  Left 3+   Right 4      Girth:   Malleoli    Left 25.5 cm   Right 25 cm      Figure 8    Left 52.5 cm   Right 50.5 cm            Function:  LOWER EXTREMITY FUNCTIONAL SCALE                EVAL    1. Any of your usual work, housework or school activities   2/4  2. Your usual hobbies, sporting     0/4  3. Getting in and out of tub      2/4  4. Walking between rooms      2/4  5. Putting on shoes or socks      1/4  6. Squatting        0/4  7. Lifting an object from the ground      2/4  8. Performing light activities around the home   2/4  9. Performing heavy activities around the home   0/4  10. Getting in and out of car      2/4  11. Walking 2 blocks       2/4  12.Walking a mile       0/4  13. Getting up and down 1 flight of stairs    1/4  14. Standing for 1 hour      0/4  15. Sitting for an hour       1/4  16. Running on even ground      0/4  17. Running on uneven ground     0/4  18. Making sharp turns when running fast    0/4  19. Hopping        1/4  20. Rolling over  in bed       4/4          Total: 22/80         Patient reports 72.5% disability based on score of the Lower Extremity Functional Scale.    Tenderness to palpation:  Anterior and medial L ankle    Other: SLS L LE 2 seconds, R LE 22 seconds     ASSESSMENT:  Patient tolerated session well with increased mobility.  He had difficulty coordinating BAPS board movements and required VC on the bike to maintain neutral LE rotation and alignment with the foot in the pedal.      Initial:  The patient is a 21 y.o. year old male who presents to physical therapy with complaints of L ankle pain.  Patient's impairments include decreased L LE strength in the ankle, knee and hip, decreased L ankle ROM, and L ankle swelling.  Patient also limited with balance and SLS time on the L LE compared to the R LE  These impairments are limiting patient's ability to participate in sports and recreational activities as well as increased standing and walking.  Patient's prognosis is good to meet the following goals.  Patient will benefit from skilled physical therapy intervention to address the above listed deficits and below listed goals to increase patient's functional mobility and stability.    Short Term Goals:    1.  Patient will be educated on HEP and report compliance 2 days per week.  2.  Patient will increase strength of the L LE by 1/3 MMT grade to increase functional stability.  3.  Patient will decrease LEFS disability to less than 55% to evidence increased functional mobility.  4.  Patient will have decreased swelling in the L ankle to = that of the R ankle to increase functional mobility.    Long Term Goals:  1.  Patient will increase strength of the L LE by 1/2 MMT grade to increase functional stability.  2.  Patient will decrease LEFS disability to less than 40% to evidence increased functional mobility.  3.  Patient will increase L LE ROM to WFL to increase functional mobility.    Co-morbidities which may impact the plan of care  "and potentially impede the patient's progress in therapy include: NA    The patient's clinical presentation is stable.    TREATMENT PROVIDED:  Ultrasound: (8 minutes)  20%, 1.0 w/cm2, 3.3 mHz to the anterior L ankle    Manual therapy: (10 minutes) STM and strumming to the L ankle     Therapeutic exercise: (15 minutes)  GSS on slant board, 3x30"  BAPS board, level 1 DF/PF, inv/ever, CW/CCW, 20x each   Bike 5 minutes level 2      PLAN:  Patient will benefit from physical therapy (2) x/week for (8) weeks including manual therapy, therapeutic exercise, functional activities, modalities, and patient education.    Thank you for this referral.    These services are reasonable and necessary for the conditions set forth above while under my care.    "

## 2017-06-28 ENCOUNTER — CLINICAL SUPPORT (OUTPATIENT)
Dept: REHABILITATION | Facility: HOSPITAL | Age: 22
End: 2017-06-28
Attending: ORTHOPAEDIC SURGERY
Payer: MEDICAID

## 2017-06-28 PROCEDURE — 97140 MANUAL THERAPY 1/> REGIONS: CPT | Performed by: PHYSICAL THERAPIST

## 2017-06-28 PROCEDURE — 97035 APP MDLTY 1+ULTRASOUND EA 15: CPT | Performed by: PHYSICAL THERAPIST

## 2017-06-28 PROCEDURE — 97110 THERAPEUTIC EXERCISES: CPT | Performed by: PHYSICAL THERAPIST

## 2017-06-29 NOTE — PROGRESS NOTES
PHYSICAL THERAPY DAILY PROGRESS NOTE     Referring Provider:  Dr. Benigno Moses    Diagnosis:       ICD-10-CM ICD-9-CM    1. Grade 3 ankle sprain, left, subsequent encounter S93.402D V58.89      845.00           Orders:  Evaluate and Treat    Date of Initial Evaluation: 17    Orders : 17    Authorization expires: 17    Visit # 3 (2 of 16 authorized)    SUBJECTIVE:  Patient reports he felt less stiffness after last appointment; however, it came back the next day.  He states he has been trying to move the ankle.      Initial:  Patient reports injury on 17 while he was playing basketball.  Another player ran in front of him while he was going to shoot the ball which caused him to fall and roll his L ankle.  He was seen in the ER at [facility] due to swelling and pain and at that time he was put in a walking boot.  He states this was too painful and then was see at [facility] with a posterior splint put on the foot.  He saw orthopedics here at [facility] on 17 for follow-up.  He had a MRI done on  and again saw orthopedics on  with placement of a cast on the L LE with NWB orders due to Grade 3 ATFL, calcaneofibular and deltoid ligament tears as well as low-grade partial-thickness of posterior tibialis tendon tear.  He had the cast removed on 17 with use of boot after this.  He presents today with stiffness and states pain after getting up from sitting for long periods.  Yestreday he went to his cousins house without his boot on with just tennis shoes and had increased pain after sitting for long time.  He presents today without boot on the foot.      PAST MEDICAL HISTORY:    Past Medical History:   Diagnosis Date    Asthma        CURRENT MEDICATIONS:    Current Outpatient Prescriptions:     ALBUTEROL, REFILL, INHL, Inhale into the lungs., Disp: , Rfl:     fluticasone-salmeterol 100-50 mcg/dose (ADVAIR) 100-50 mcg/dose diskus inhaler, Inhale 1 puff into the lungs 2 (two) times daily., Disp:  , Rfl:     hyoscyamine (ANASPAZ,LEVSIN) 0.125 mg Tab, Take 1 tablet (125 mcg total) by mouth every 6 (six) hours as needed (abdominal cramping)., Disp: 15 tablet, Rfl: 0    ibuprofen (ADVIL,MOTRIN) 600 MG tablet, Take 1 tablet (600 mg total) by mouth 3 (three) times daily., Disp: 40 tablet, Rfl: 1    OBJECTIVE:  Pain: 5-6 /10, located anterior/middle ankle, described as sore/achy    Sensation: intact to light touch     Ankle ROM:  Plantar Flexion  Left  20 to 30 degrees  Right 60 degrees        Dorsi Flexion   Left -5 to 5 degrees   Right 10 degrees      Inversion   Left 5 to 15 degrees  Right 20 degrees       Eversion   Left       Strength:  Anterior tibialis   Left 3+   Right 4+      Posterior tibialis  Left 3+   Right 4+     Gastrocnemius  Left 3   Right 4+      Soleus    Left 3   Right 4+     Peroneals   Left 3+   Right 4+       Hamstrings    Left 4-   Right 5-     Quadriceps   Left 4-   Right 4+     Iliopsoas   Left 3+   Right 4-     Gluteus Medius  Left 3+   Right 4+     Gluteus Charlie  Left 3+   Right 4      Girth:   Malleoli    Left 25.5 cm   Right 25 cm      Figure 8    Left 52.5 cm   Right 50.5 cm            Function:  LOWER EXTREMITY FUNCTIONAL SCALE                EVAL    1. Any of your usual work, housework or school activities   2/4  2. Your usual hobbies, sporting     0/4  3. Getting in and out of tub      2/4  4. Walking between rooms      2/4  5. Putting on shoes or socks      1/4  6. Squatting        0/4  7. Lifting an object from the ground      2/4  8. Performing light activities around the home   2/4  9. Performing heavy activities around the home   0/4  10. Getting in and out of car      2/4  11. Walking 2 blocks       2/4  12.Walking a mile       0/4  13. Getting up and down 1 flight of stairs    1/4  14. Standing for 1 hour      0/4  15. Sitting for an hour       1/4  16. Running on even ground      0/4  17. Running on uneven ground     0/4  18. Making sharp turns when running  fast    0/4  19. Hopping        1/4  20. Rolling over in bed       4/4          Total: 22/80         Patient reports 72.5% disability based on score of the Lower Extremity Functional Scale.    Tenderness to palpation:  Anterior and medial L ankle    Other: SLS L LE 2 seconds, R LE 22 seconds     ASSESSMENT:  Patient able to perform resisted ankle exercises with VC for form.  He was also able to perform SLS with minimal LOB.      Initial:  The patient is a 21 y.o. year old male who presents to physical therapy with complaints of L ankle pain.  Patient's impairments include decreased L LE strength in the ankle, knee and hip, decreased L ankle ROM, and L ankle swelling.  Patient also limited with balance and SLS time on the L LE compared to the R LE  These impairments are limiting patient's ability to participate in sports and recreational activities as well as increased standing and walking.  Patient's prognosis is good to meet the following goals.  Patient will benefit from skilled physical therapy intervention to address the above listed deficits and below listed goals to increase patient's functional mobility and stability.    Short Term Goals:    1.  Patient will be educated on HEP and report compliance 2 days per week.  2.  Patient will increase strength of the L LE by 1/3 MMT grade to increase functional stability.  3.  Patient will decrease LEFS disability to less than 55% to evidence increased functional mobility.  4.  Patient will have decreased swelling in the L ankle to = that of the R ankle to increase functional mobility.    Long Term Goals:  1.  Patient will increase strength of the L LE by 1/2 MMT grade to increase functional stability.  2.  Patient will decrease LEFS disability to less than 40% to evidence increased functional mobility.  3.  Patient will increase L LE ROM to WFL to increase functional mobility.    Co-morbidities which may impact the plan of care and potentially impede the patient's  "progress in therapy include: NA    The patient's clinical presentation is stable.    TREATMENT PROVIDED:  Ultrasound: (8 minutes)  20%, 1.0 w/cm2, 3.3 mHz to the anterior L ankle    Manual therapy: (10 minutes) STM and strumming to the L ankle to increase ankle mobility and decrease tension and tone.    Therapeutic exercise: (20 minutes)  GSS on slant board, 3x30"  BAPS board, level 1 DF/PF, inv/ever, CW/CCW, 20x each   Ankle 4-way, yellow band, 2x10  L SLS 3x15"  Bike 5 minutes level 2  Leg press, 7 bands, 3x10       PLAN:  Patient will benefit from physical therapy (2) x/week for (8) weeks including manual therapy, therapeutic exercise, functional activities, modalities, and patient education.    Thank you for this referral.    These services are reasonable and necessary for the conditions set forth above while under my care.    "

## 2017-07-05 ENCOUNTER — CLINICAL SUPPORT (OUTPATIENT)
Dept: REHABILITATION | Facility: HOSPITAL | Age: 22
End: 2017-07-05
Attending: ORTHOPAEDIC SURGERY
Payer: MEDICAID

## 2017-07-05 PROCEDURE — 97110 THERAPEUTIC EXERCISES: CPT | Performed by: PHYSICAL THERAPIST

## 2017-07-05 PROCEDURE — 97035 APP MDLTY 1+ULTRASOUND EA 15: CPT | Performed by: PHYSICAL THERAPIST

## 2017-07-05 PROCEDURE — 97140 MANUAL THERAPY 1/> REGIONS: CPT | Performed by: PHYSICAL THERAPIST

## 2017-07-05 NOTE — PROGRESS NOTES
PHYSICAL THERAPY DAILY PROGRESS NOTE     Referring Provider:  Dr. Benigno Moses    Diagnosis:       ICD-10-CM ICD-9-CM    1. Grade 3 ankle sprain, left, subsequent encounter S93.402D V58.89      845.00           Orders:  Evaluate and Treat    Date of Initial Evaluation: 17    Orders : 17    Authorization expires: 17    Visit # 4 (3 of 16 authorized)    SUBJECTIVE:  Patient states he was walking a lot yesterday and isn't having as much pain.  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        Initial:  Patient reports injury on 17 while he was playing basketball.  Another player ran in front of him while he was going to shoot the ball which caused him to fall and roll his L ankle.  He was seen in the ER at [facility] due to swelling and pain and at that time he was put in a walking boot.  He states this was too painful and then was see at [facility] with a posterior splint put on the foot.  He saw orthopedics here at [facility] on 17 for follow-up.  He had a MRI done on  and again saw orthopedics on  with placement of a cast on the L LE with NWB orders due to Grade 3 ATFL, calcaneofibular and deltoid ligament tears as well as low-grade partial-thickness of posterior tibialis tendon tear.  He had the cast removed on 17 with use of boot after this.  He presents today with stiffness and states pain after getting up from sitting for long periods.  Yestreday he went to his cousins house without his boot on with just tennis shoes and had increased pain after sitting for long time.  He presents today without boot on the foot.      PAST MEDICAL HISTORY:    Past Medical History:   Diagnosis Date    Asthma        CURRENT MEDICATIONS:    Current Outpatient Prescriptions:     ALBUTEROL, REFILL, INHL, Inhale into the lungs., Disp: , Rfl:     fluticasone-salmeterol 100-50 mcg/dose (ADVAIR) 100-50 mcg/dose diskus inhaler, Inhale 1 puff  into the lungs 2 (two) times daily., Disp: , Rfl:     hyoscyamine (ANASPAZ,LEVSIN) 0.125 mg Tab, Take 1 tablet (125 mcg total) by mouth every 6 (six) hours as needed (abdominal cramping)., Disp: 15 tablet, Rfl: 0    ibuprofen (ADVIL,MOTRIN) 600 MG tablet, Take 1 tablet (600 mg total) by mouth 3 (three) times daily., Disp: 40 tablet, Rfl: 1    OBJECTIVE:  Pain: 5-6 /10, located anterior/middle ankle, described as sore/achy    Sensation: intact to light touch     Ankle ROM:  Plantar Flexion  Left  20 to 30 degrees  Right 60 degrees        Dorsi Flexion   Left -5 to 5 degrees   Right 10 degrees      Inversion   Left 5 to 15 degrees  Right 20 degrees       Eversion   Left       Strength:  Anterior tibialis   Left 3+   Right 4+      Posterior tibialis  Left 3+   Right 4+     Gastrocnemius  Left 3   Right 4+      Soleus    Left 3   Right 4+     Peroneals   Left 3+   Right 4+       Hamstrings    Left 4-   Right 5-     Quadriceps   Left 4-   Right 4+     Iliopsoas   Left 3+   Right 4-     Gluteus Medius  Left 3+   Right 4+     Gluteus Charlie  Left 3+   Right 4      Girth:   Malleoli    Left 25.5 cm   Right 25 cm      Figure 8    Left 52.5 cm   Right 50.5 cm            Function:  LOWER EXTREMITY FUNCTIONAL SCALE                EVAL    1. Any of your usual work, housework or school activities   2/4  2. Your usual hobbies, sporting     0/4  3. Getting in and out of tub      2/4  4. Walking between rooms      2/4  5. Putting on shoes or socks      1/4  6. Squatting        0/4  7. Lifting an object from the ground      2/4  8. Performing light activities around the home   2/4  9. Performing heavy activities around the home   0/4  10. Getting in and out of car      2/4  11. Walking 2 blocks       2/4  12.Walking a mile       0/4  13. Getting up and down 1 flight of stairs    1/4  14. Standing for 1 hour      0/4  15. Sitting for an hour       1/4  16. Running on even ground      0/4  17. Running on uneven ground     0/4  18.  Making sharp turns when running fast    0/4  19. Hopping        1/4  20. Rolling over in bed       4/4          Total: 22/80         Patient reports 72.5% disability based on score of the Lower Extremity Functional Scale.    Tenderness to palpation:  Anterior and medial L ankle    Other: SLS L LE 2 seconds, R LE 22 seconds     ASSESSMENT:  Increased ROM noted with patient able to use increased level on the BAPS board.  He also tolerated increased resistance with ankle 4-way strengthening and stated the increase still felt easy.  He was issued a green theraband for home use.        Initial:  The patient is a 21 y.o. year old male who presents to physical therapy with complaints of L ankle pain.  Patient's impairments include decreased L LE strength in the ankle, knee and hip, decreased L ankle ROM, and L ankle swelling.  Patient also limited with balance and SLS time on the L LE compared to the R LE  These impairments are limiting patient's ability to participate in sports and recreational activities as well as increased standing and walking.  Patient's prognosis is good to meet the following goals.  Patient will benefit from skilled physical therapy intervention to address the above listed deficits and below listed goals to increase patient's functional mobility and stability.    Short Term Goals:    1.  Patient will be educated on HEP and report compliance 2 days per week.  2.  Patient will increase strength of the L LE by 1/3 MMT grade to increase functional stability.  3.  Patient will decrease LEFS disability to less than 55% to evidence increased functional mobility.  4.  Patient will have decreased swelling in the L ankle to = that of the R ankle to increase functional mobility.    Long Term Goals:  1.  Patient will increase strength of the L LE by 1/2 MMT grade to increase functional stability.  2.  Patient will decrease LEFS disability to less than 40% to evidence increased functional mobility.  3.  Patient  "will increase L LE ROM to WFL to increase functional mobility.    Co-morbidities which may impact the plan of care and potentially impede the patient's progress in therapy include: NA    The patient's clinical presentation is stable.    TREATMENT PROVIDED:  Ultrasound: (8 minutes)  20%, 1.0 w/cm2, 3.3 mHz to the anterior L ankle    Manual therapy: (12 minutes) STM and strumming to the L ankle to increase ankle mobility and decrease tension and tone with talus glides to increase ROM specifically DF.      Therapeutic exercise: (24 minutes)  Gastroc and soleus stretch on slant board, 3x30" each   BAPS board, level 2 DF/PF, inv/ever, CW/CCW, 30x each   Ankle 4-way, red band, 2x10  L SLS 3x15"  Bike 5 minutes level 2  Leg press, 7 bands, 3x10   Supine HSS with strap, 3x30" B LE       PLAN:  Patient will benefit from physical therapy (2) x/week for (8) weeks including manual therapy, therapeutic exercise, functional activities, modalities, and patient education.    Thank you for this referral.    These services are reasonable and necessary for the conditions set forth above while under my care.    "

## 2017-07-07 ENCOUNTER — CLINICAL SUPPORT (OUTPATIENT)
Dept: REHABILITATION | Facility: HOSPITAL | Age: 22
End: 2017-07-07
Attending: ORTHOPAEDIC SURGERY
Payer: MEDICAID

## 2017-07-07 PROCEDURE — 97140 MANUAL THERAPY 1/> REGIONS: CPT | Performed by: PHYSICAL THERAPIST

## 2017-07-07 PROCEDURE — 97035 APP MDLTY 1+ULTRASOUND EA 15: CPT | Performed by: PHYSICAL THERAPIST

## 2017-07-07 PROCEDURE — 97110 THERAPEUTIC EXERCISES: CPT | Performed by: PHYSICAL THERAPIST

## 2017-07-07 NOTE — PROGRESS NOTES
PHYSICAL THERAPY DAILY PROGRESS NOTE     Referring Provider:  Dr. Benigno Moses    Diagnosis:       ICD-10-CM ICD-9-CM    1. Grade 3 ankle sprain, left, subsequent encounter S93.402D V58.89      845.00           Orders:  Evaluate and Treat    Date of Initial Evaluation: 17    Orders : 17    Authorization expires: 17    Visit # 5 (4 of 16 authorized)    SUBJECTIVE:  Patient states he is feeling ok.  He is having some pain along the medial foot intermittently.  He reports he used the boot yesterday while at work and feels stiff after wearing it.      Initial:  Patient reports injury on 17 while he was playing basketball.  Another player ran in front of him while he was going to shoot the ball which caused him to fall and roll his L ankle.  He was seen in the ER at [facility] due to swelling and pain and at that time he was put in a walking boot.  He states this was too painful and then was see at [facility] with a posterior splint put on the foot.  He saw orthopedics here at [facility] on 17 for follow-up.  He had a MRI done on  and again saw orthopedics on  with placement of a cast on the L LE with NWB orders due to Grade 3 ATFL, calcaneofibular and deltoid ligament tears as well as low-grade partial-thickness of posterior tibialis tendon tear.  He had the cast removed on 17 with use of boot after this.  He presents today with stiffness and states pain after getting up from sitting for long periods.  Yestreday he went to his cousins house without his boot on with just tennis shoes and had increased pain after sitting for long time.  He presents today without boot on the foot.      PAST MEDICAL HISTORY:    Past Medical History:   Diagnosis Date    Asthma        CURRENT MEDICATIONS:    Current Outpatient Prescriptions:     ALBUTEROL, REFILL, INHL, Inhale into the lungs., Disp: , Rfl:     fluticasone-salmeterol 100-50 mcg/dose (ADVAIR) 100-50 mcg/dose diskus inhaler, Inhale 1 puff into the  lungs 2 (two) times daily., Disp: , Rfl:     hyoscyamine (ANASPAZ,LEVSIN) 0.125 mg Tab, Take 1 tablet (125 mcg total) by mouth every 6 (six) hours as needed (abdominal cramping)., Disp: 15 tablet, Rfl: 0    ibuprofen (ADVIL,MOTRIN) 600 MG tablet, Take 1 tablet (600 mg total) by mouth 3 (three) times daily., Disp: 40 tablet, Rfl: 1    OBJECTIVE:  Pain: 5-6 /10, located anterior/middle ankle, described as sore/achy    Sensation: intact to light touch     Ankle ROM:  Plantar Flexion  Left  20 to 30 degrees  Right 60 degrees        Dorsi Flexion   Left -5 to 5 degrees   Right 10 degrees      Inversion   Left 5 to 15 degrees  Right 20 degrees       Eversion   Left       Strength:  Anterior tibialis   Left 3+   Right 4+      Posterior tibialis  Left 3+   Right 4+     Gastrocnemius  Left 3   Right 4+      Soleus    Left 3   Right 4+     Peroneals   Left 3+   Right 4+       Hamstrings    Left 4-   Right 5-     Quadriceps   Left 4-   Right 4+     Iliopsoas   Left 3+   Right 4-     Gluteus Medius  Left 3+   Right 4+     Gluteus Charlie  Left 3+   Right 4      Girth:   Malleoli    Left 25.5 cm   Right 25 cm      Figure 8    Left 52.5 cm   Right 50.5 cm            Function:  LOWER EXTREMITY FUNCTIONAL SCALE                EVAL    1. Any of your usual work, housework or school activities   2/4  2. Your usual hobbies, sporting     0/4  3. Getting in and out of tub      2/4  4. Walking between rooms      2/4  5. Putting on shoes or socks      1/4  6. Squatting        0/4  7. Lifting an object from the ground      2/4  8. Performing light activities around the home   2/4  9. Performing heavy activities around the home   0/4  10. Getting in and out of car      2/4  11. Walking 2 blocks       2/4  12.Walking a mile       0/4  13. Getting up and down 1 flight of stairs    1/4  14. Standing for 1 hour      0/4  15. Sitting for an hour       1/4  16. Running on even ground      0/4  17. Running on uneven ground     0/4  18. Making  sharp turns when running fast    0/4  19. Hopping        1/4  20. Rolling over in bed       4/4          Total: 22/80         Patient reports 72.5% disability based on score of the Lower Extremity Functional Scale.    Tenderness to palpation:  Anterior and medial L ankle    Other: SLS L LE 2 seconds, R LE 22 seconds     ASSESSMENT:   Patient tolerated progressed strengthening well.  He had increased difficulty on the L hip exercises compared to the R side.      Initial:  The patient is a 21 y.o. year old male who presents to physical therapy with complaints of L ankle pain.  Patient's impairments include decreased L LE strength in the ankle, knee and hip, decreased L ankle ROM, and L ankle swelling.  Patient also limited with balance and SLS time on the L LE compared to the R LE  These impairments are limiting patient's ability to participate in sports and recreational activities as well as increased standing and walking.  Patient's prognosis is good to meet the following goals.  Patient will benefit from skilled physical therapy intervention to address the above listed deficits and below listed goals to increase patient's functional mobility and stability.    Short Term Goals:    1.  Patient will be educated on HEP and report compliance 2 days per week.  2.  Patient will increase strength of the L LE by 1/3 MMT grade to increase functional stability.  3.  Patient will decrease LEFS disability to less than 55% to evidence increased functional mobility.  4.  Patient will have decreased swelling in the L ankle to = that of the R ankle to increase functional mobility.    Long Term Goals:  1.  Patient will increase strength of the L LE by 1/2 MMT grade to increase functional stability.  2.  Patient will decrease LEFS disability to less than 40% to evidence increased functional mobility.  3.  Patient will increase L LE ROM to WFL to increase functional mobility.    Co-morbidities which may impact the plan of care and  "potentially impede the patient's progress in therapy include: NA    The patient's clinical presentation is stable.    TREATMENT PROVIDED:  Ultrasound: (8 minutes)  20%, 1.0 w/cm2, 3.3 mHz to the anterior/medial L ankle.     Manual therapy: (10 minutes) STM and strumming to the L ankle to increase ankle mobility and decrease tension and tone with talus glides to increase ROM specifically DF.      Therapeutic exercise: (24 minutes)  Bike 5 minutes level 2  Leg press, 7 bands, 3x10   Standing hip abd/ext/flex, green band, 2x10 each B LE   Heel/toe raises, 10x  Gastroc stretch on slant board, 3x30" each   Ankle 4-way, red band, 2x10  BAPS board, level 2 DF/PF, inv/ever, CW/CCW, 30x each     Deferred today:   L SLS 3x15"  Supine HSS with strap, 3x30" B LE       PLAN:  Patient will benefit from physical therapy (2) x/week for (8) weeks including manual therapy, therapeutic exercise, functional activities, modalities, and patient education.    Thank you for this referral.    These services are reasonable and necessary for the conditions set forth above while under my care.    "

## 2017-07-10 ENCOUNTER — CLINICAL SUPPORT (OUTPATIENT)
Dept: REHABILITATION | Facility: HOSPITAL | Age: 22
End: 2017-07-10
Attending: ORTHOPAEDIC SURGERY
Payer: MEDICAID

## 2017-07-10 PROCEDURE — 97110 THERAPEUTIC EXERCISES: CPT | Performed by: PHYSICAL THERAPIST

## 2017-07-10 NOTE — PROGRESS NOTES
PHYSICAL THERAPY DAILY PROGRESS NOTE     Referring Provider:  Dr. Benigno Moses    Diagnosis:       ICD-10-CM ICD-9-CM    1. Grade 3 ankle sprain, left, subsequent encounter S93.402D V58.89      845.00           Orders:  Evaluate and Treat    Date of Initial Evaluation: 17    Orders : 17    Authorization expires: 17    Visit # 6 (5 of 16 authorized)    SUBJECTIVE:  Patient states he wore his boot for work on Saturday for approx 8 hours.  He feels stiffness in the ankle when he first gets up or first starts walking.     Initial:  Patient reports injury on 17 while he was playing basketball.  Another player ran in front of him while he was going to shoot the ball which caused him to fall and roll his L ankle.  He was seen in the ER at [facility] due to swelling and pain and at that time he was put in a walking boot.  He states this was too painful and then was see at [facility] with a posterior splint put on the foot.  He saw orthopedics here at [facility] on 17 for follow-up.  He had a MRI done on  and again saw orthopedics on  with placement of a cast on the L LE with NWB orders due to Grade 3 ATFL, calcaneofibular and deltoid ligament tears as well as low-grade partial-thickness of posterior tibialis tendon tear.  He had the cast removed on 17 with use of boot after this.  He presents today with stiffness and states pain after getting up from sitting for long periods.  Yestreday he went to his cousins house without his boot on with just tennis shoes and had increased pain after sitting for long time.  He presents today without boot on the foot.      PAST MEDICAL HISTORY:    Past Medical History:   Diagnosis Date    Asthma        CURRENT MEDICATIONS:    Current Outpatient Prescriptions:     ALBUTEROL, REFILL, INHL, Inhale into the lungs., Disp: , Rfl:     fluticasone-salmeterol 100-50 mcg/dose (ADVAIR) 100-50 mcg/dose diskus inhaler, Inhale 1 puff into the lungs 2 (two) times daily.,  Disp: , Rfl:     hyoscyamine (ANASPAZ,LEVSIN) 0.125 mg Tab, Take 1 tablet (125 mcg total) by mouth every 6 (six) hours as needed (abdominal cramping)., Disp: 15 tablet, Rfl: 0    ibuprofen (ADVIL,MOTRIN) 600 MG tablet, Take 1 tablet (600 mg total) by mouth 3 (three) times daily., Disp: 40 tablet, Rfl: 1    OBJECTIVE:  Pain: 5-6 /10, located anterior/middle ankle, described as sore/achy    Sensation: intact to light touch     Ankle ROM:  Plantar Flexion  Left  20 to 30 degrees  Right 60 degrees        Dorsi Flexion   Left -5 to 5 degrees   Right 10 degrees      Inversion   Left 5 to 15 degrees  Right 20 degrees       Eversion   Left       Strength:  Anterior tibialis   Left 3+   Right 4+      Posterior tibialis  Left 3+   Right 4+     Gastrocnemius  Left 3   Right 4+      Soleus    Left 3   Right 4+     Peroneals   Left 3+   Right 4+       Hamstrings    Left 4-   Right 5-     Quadriceps   Left 4-   Right 4+     Iliopsoas   Left 3+   Right 4-     Gluteus Medius  Left 3+   Right 4+     Gluteus Charlie  Left 3+   Right 4      Girth:   Malleoli    Left 25.5 cm   Right 25 cm      Figure 8    Left 52.5 cm   Right 50.5 cm            Function:  LOWER EXTREMITY FUNCTIONAL SCALE                EVAL    1. Any of your usual work, housework or school activities   2/4  2. Your usual hobbies, sporting     0/4  3. Getting in and out of tub      2/4  4. Walking between rooms      2/4  5. Putting on shoes or socks      1/4  6. Squatting        0/4  7. Lifting an object from the ground      2/4  8. Performing light activities around the home   2/4  9. Performing heavy activities around the home   0/4  10. Getting in and out of car      2/4  11. Walking 2 blocks       2/4  12.Walking a mile       0/4  13. Getting up and down 1 flight of stairs    1/4  14. Standing for 1 hour      0/4  15. Sitting for an hour       1/4  16. Running on even ground      0/4  17. Running on uneven ground     0/4  18. Making sharp turns when running  fast    0/4  19. Hopping        1/4  20. Rolling over in bed       4/4          Total: 22/80         Patient reports 72.5% disability based on score of the Lower Extremity Functional Scale.    Tenderness to palpation:  Anterior and medial L ankle    Other: SLS L LE 2 seconds, R LE 22 seconds     ASSESSMENT:   Stiffness in the L ankle decreased with therapeutic exercise.  Decreased flexibility remains in the HS and quad with pulling felt during stretch.        Initial:  The patient is a 21 y.o. year old male who presents to physical therapy with complaints of L ankle pain.  Patient's impairments include decreased L LE strength in the ankle, knee and hip, decreased L ankle ROM, and L ankle swelling.  Patient also limited with balance and SLS time on the L LE compared to the R LE  These impairments are limiting patient's ability to participate in sports and recreational activities as well as increased standing and walking.  Patient's prognosis is good to meet the following goals.  Patient will benefit from skilled physical therapy intervention to address the above listed deficits and below listed goals to increase patient's functional mobility and stability.    Short Term Goals:    1.  Patient will be educated on HEP and report compliance 2 days per week.  2.  Patient will increase strength of the L LE by 1/3 MMT grade to increase functional stability.  3.  Patient will decrease LEFS disability to less than 55% to evidence increased functional mobility.  4.  Patient will have decreased swelling in the L ankle to = that of the R ankle to increase functional mobility.    Long Term Goals:  1.  Patient will increase strength of the L LE by 1/2 MMT grade to increase functional stability.  2.  Patient will decrease LEFS disability to less than 40% to evidence increased functional mobility.  3.  Patient will increase L LE ROM to WFL to increase functional mobility.    Co-morbidities which may impact the plan of care and  "potentially impede the patient's progress in therapy include: NA    The patient's clinical presentation is stable.    TREATMENT PROVIDED:  Ultrasound: (deferred today)  20%, 1.0 w/cm2, 3.3 mHz to the anterior/medial L ankle.     Manual therapy: (deferred today) STM and strumming to the L ankle to increase ankle mobility and decrease tension and tone with talus glides to increase ROM specifically DF.      Therapeutic exercise: (45 minutes)  Bike 5 minutes level 2  Leg press, 7 bands, 5x10   Gastroc stretch on slant board, 3x30" each   Standing hip abd/ext/flex, green band, 2x10 each B LE   Alt heel/toe raises, 20x  L SLS 3x30"  Supine HSS with strap, 3x30" B LE   Prone quad stretch, B LE, 3x30"   Ankle 4-way, green band, 2x10  BAPS board, level 2 DF/PF, inv/ever, CW/CCW, 30x each         PLAN:  Patient will benefit from physical therapy (2) x/week for (8) weeks including manual therapy, therapeutic exercise, functional activities, modalities, and patient education.    Thank you for this referral.    These services are reasonable and necessary for the conditions set forth above while under my care.    "

## 2017-07-13 ENCOUNTER — CLINICAL SUPPORT (OUTPATIENT)
Dept: REHABILITATION | Facility: HOSPITAL | Age: 22
End: 2017-07-13
Attending: ORTHOPAEDIC SURGERY
Payer: MEDICAID

## 2017-07-13 PROCEDURE — 97110 THERAPEUTIC EXERCISES: CPT | Performed by: PHYSICAL THERAPIST

## 2017-07-13 PROCEDURE — 97035 APP MDLTY 1+ULTRASOUND EA 15: CPT | Performed by: PHYSICAL THERAPIST

## 2017-07-13 NOTE — PROGRESS NOTES
PHYSICAL THERAPY DAILY PROGRESS NOTE     Referring Provider:  Dr. Benigno Moses    Diagnosis:       ICD-10-CM ICD-9-CM    1. Grade 3 ankle sprain, left, subsequent encounter S93.402D V58.89      845.00           Orders:  Evaluate and Treat    Date of Initial Evaluation: 17    Orders : 17    Authorization expires: 17    Visit # 7 (6 of 16 authorized)    SUBJECTIVE:  Patient reports he has been stretching the leg while standing and sitting since last visit and feels more flexible as well as better with his walking.       Initial:  Patient reports injury on 17 while he was playing basketball.  Another player ran in front of him while he was going to shoot the ball which caused him to fall and roll his L ankle.  He was seen in the ER at [facility] due to swelling and pain and at that time he was put in a walking boot.  He states this was too painful and then was see at [facility] with a posterior splint put on the foot.  He saw orthopedics here at [facility] on 17 for follow-up.  He had a MRI done on  and again saw orthopedics on  with placement of a cast on the L LE with NWB orders due to Grade 3 ATFL, calcaneofibular and deltoid ligament tears as well as low-grade partial-thickness of posterior tibialis tendon tear.  He had the cast removed on 17 with use of boot after this.  He presents today with stiffness and states pain after getting up from sitting for long periods.  Yestreday he went to his cousins house without his boot on with just tennis shoes and had increased pain after sitting for long time.  He presents today without boot on the foot.      PAST MEDICAL HISTORY:    Past Medical History:   Diagnosis Date    Asthma        CURRENT MEDICATIONS:    Current Outpatient Prescriptions:     ALBUTEROL, REFILL, INHL, Inhale into the lungs., Disp: , Rfl:     fluticasone-salmeterol 100-50 mcg/dose (ADVAIR) 100-50 mcg/dose diskus inhaler, Inhale 1 puff into the lungs 2 (two) times daily.,  Disp: , Rfl:     hyoscyamine (ANASPAZ,LEVSIN) 0.125 mg Tab, Take 1 tablet (125 mcg total) by mouth every 6 (six) hours as needed (abdominal cramping)., Disp: 15 tablet, Rfl: 0    ibuprofen (ADVIL,MOTRIN) 600 MG tablet, Take 1 tablet (600 mg total) by mouth 3 (three) times daily., Disp: 40 tablet, Rfl: 1    OBJECTIVE:  Pain: 5-6 /10, located anterior/middle ankle, described as sore/achy    Sensation: intact to light touch     Ankle ROM:  Plantar Flexion  Left  20 to 30 degrees  Right 60 degrees        Dorsi Flexion   Left -5 to 5 degrees   Right 10 degrees      Inversion   Left 5 to 15 degrees  Right 20 degrees       Eversion   Left       Strength:  Anterior tibialis   Left 3+   Right 4+      Posterior tibialis  Left 3+   Right 4+     Gastrocnemius  Left 3   Right 4+      Soleus    Left 3   Right 4+     Peroneals   Left 3+   Right 4+       Hamstrings    Left 4-   Right 5-     Quadriceps   Left 4-   Right 4+     Iliopsoas   Left 3+   Right 4-     Gluteus Medius  Left 3+   Right 4+     Gluteus Charlie  Left 3+   Right 4      Girth:   Malleoli    Left 25.5 cm   Right 25 cm      Figure 8    Left 52.5 cm   Right 50.5 cm            Function:  LOWER EXTREMITY FUNCTIONAL SCALE                EVAL    1. Any of your usual work, housework or school activities   2/4  2. Your usual hobbies, sporting     0/4  3. Getting in and out of tub      2/4  4. Walking between rooms      2/4  5. Putting on shoes or socks      1/4  6. Squatting        0/4  7. Lifting an object from the ground      2/4  8. Performing light activities around the home   2/4  9. Performing heavy activities around the home   0/4  10. Getting in and out of car      2/4  11. Walking 2 blocks       2/4  12.Walking a mile       0/4  13. Getting up and down 1 flight of stairs    1/4  14. Standing for 1 hour      0/4  15. Sitting for an hour       1/4  16. Running on even ground      0/4  17. Running on uneven ground     0/4  18. Making sharp turns when running  fast    0/4  19. Hopping        1/4  20. Rolling over in bed       4/4          Total: 22/80         Patient reports 72.5% disability based on score of the Lower Extremity Functional Scale.    Tenderness to palpation:  Anterior and medial L ankle    Other: SLS L LE 2 seconds, R LE 22 seconds     ASSESSMENT:   Patient continues with antalgic gait with decreased DF on the L LE.  He tolerated increased resistance and challenges well with no pain in the ankle.         Initial:  The patient is a 21 y.o. year old male who presents to physical therapy with complaints of L ankle pain.  Patient's impairments include decreased L LE strength in the ankle, knee and hip, decreased L ankle ROM, and L ankle swelling.  Patient also limited with balance and SLS time on the L LE compared to the R LE  These impairments are limiting patient's ability to participate in sports and recreational activities as well as increased standing and walking.  Patient's prognosis is good to meet the following goals.  Patient will benefit from skilled physical therapy intervention to address the above listed deficits and below listed goals to increase patient's functional mobility and stability.    Short Term Goals:    1.  Patient will be educated on HEP and report compliance 2 days per week.  2.  Patient will increase strength of the L LE by 1/3 MMT grade to increase functional stability.  3.  Patient will decrease LEFS disability to less than 55% to evidence increased functional mobility.  4.  Patient will have decreased swelling in the L ankle to = that of the R ankle to increase functional mobility.    Long Term Goals:  1.  Patient will increase strength of the L LE by 1/2 MMT grade to increase functional stability.  2.  Patient will decrease LEFS disability to less than 40% to evidence increased functional mobility.  3.  Patient will increase L LE ROM to WFL to increase functional mobility.    Co-morbidities which may impact the plan of care and  "potentially impede the patient's progress in therapy include: NA    The patient's clinical presentation is stable.    TREATMENT PROVIDED:  Ultrasound: (8 minutes)  20%, 1.2 w/cm2, 3.3 mHz to the dorsum of the L ankle at the talus.     Manual therapy: (5 minutes - no charge) STM and strumming to the L ankle to increase ankle mobility and decrease tension and tone with talus glides to increase ROM specifically DF.      Therapeutic exercise: (30 minutes)  Bike 5 minutes level 3  Leg press, 8 bands, 1x10  Single leg press, 7 bands, 3x10 each LE    Gastroc stretch on slant board, 3x30" each   Standing hip abd/ext/flex, black band, 3x10 each B LE   Alt heel/toe raises on foam, 30x  L SLS 3x30" on foam   Toe walk, 2x30 feet  Heel walk, 4x30 feet     Deferred today:   Supine HSS with strap, 3x30" B LE   Prone quad stretch, B LE, 3x30"   Ankle 4-way, green band, 2x10  BAPS board, level 2 DF/PF, inv/ever, CW/CCW, 30x each         PLAN:  Patient will benefit from physical therapy (2) x/week for (8) weeks including manual therapy, therapeutic exercise, functional activities, modalities, and patient education.    Thank you for this referral.    These services are reasonable and necessary for the conditions set forth above while under my care.    "

## 2017-07-17 ENCOUNTER — CLINICAL SUPPORT (OUTPATIENT)
Dept: REHABILITATION | Facility: HOSPITAL | Age: 22
End: 2017-07-17
Attending: ORTHOPAEDIC SURGERY
Payer: MEDICAID

## 2017-07-17 PROCEDURE — 97110 THERAPEUTIC EXERCISES: CPT | Performed by: PHYSICAL THERAPIST

## 2017-07-17 NOTE — PROGRESS NOTES
PHYSICAL THERAPY DAILY PROGRESS NOTE     Referring Provider:  Dr. Benigno Moses    Diagnosis:       ICD-10-CM ICD-9-CM    1. Grade 3 ankle sprain, left, subsequent encounter S93.402D V58.89      845.00           Orders:  Evaluate and Treat    Date of Initial Evaluation: 17    Orders : 17    Authorization expires: 17    Visit # 8 (7 of 16 authorized)    SUBJECTIVE:   Patient reports he had increased soreness the day after last session; however, the day after that he felt great with no issues at all.  Today he is feeling good.      Initial:  Patient reports injury on 17 while he was playing basketball.  Another player ran in front of him while he was going to shoot the ball which caused him to fall and roll his L ankle.  He was seen in the ER at [facility] due to swelling and pain and at that time he was put in a walking boot.  He states this was too painful and then was see at [facility] with a posterior splint put on the foot.  He saw orthopedics here at [facility] on 17 for follow-up.  He had a MRI done on  and again saw orthopedics on  with placement of a cast on the L LE with NWB orders due to Grade 3 ATFL, calcaneofibular and deltoid ligament tears as well as low-grade partial-thickness of posterior tibialis tendon tear.  He had the cast removed on 17 with use of boot after this.  He presents today with stiffness and states pain after getting up from sitting for long periods.  Yestreday he went to his cousins house without his boot on with just tennis shoes and had increased pain after sitting for long time.  He presents today without boot on the foot.      PAST MEDICAL HISTORY:    Past Medical History:   Diagnosis Date    Asthma        CURRENT MEDICATIONS:    Current Outpatient Prescriptions:     ALBUTEROL, REFILL, INHL, Inhale into the lungs., Disp: , Rfl:     fluticasone-salmeterol 100-50 mcg/dose (ADVAIR) 100-50 mcg/dose diskus inhaler, Inhale 1 puff into the lungs 2 (two)  times daily., Disp: , Rfl:     hyoscyamine (ANASPAZ,LEVSIN) 0.125 mg Tab, Take 1 tablet (125 mcg total) by mouth every 6 (six) hours as needed (abdominal cramping)., Disp: 15 tablet, Rfl: 0    ibuprofen (ADVIL,MOTRIN) 600 MG tablet, Take 1 tablet (600 mg total) by mouth 3 (three) times daily., Disp: 40 tablet, Rfl: 1    OBJECTIVE:  Pain: 5-6 /10, located anterior/middle ankle, described as sore/achy    Sensation: intact to light touch     Ankle ROM:  Plantar Flexion  Left  20 to 30 degrees  Right 60 degrees        Dorsi Flexion   Left -5 to 5 degrees   Right 10 degrees      Inversion   Left 5 to 15 degrees  Right 20 degrees       Eversion   Left       Strength:  Anterior tibialis   Left 3+   Right 4+      Posterior tibialis  Left 3+   Right 4+     Gastrocnemius  Left 3   Right 4+      Soleus    Left 3   Right 4+     Peroneals   Left 3+   Right 4+       Hamstrings    Left 4-   Right 5-     Quadriceps   Left 4-   Right 4+     Iliopsoas   Left 3+   Right 4-     Gluteus Medius  Left 3+   Right 4+     Gluteus Charlie  Left 3+   Right 4      Girth:   Malleoli    Left 25.5 cm   Right 25 cm      Figure 8    Left 52.5 cm   Right 50.5 cm            Function:  LOWER EXTREMITY FUNCTIONAL SCALE                EVAL    1. Any of your usual work, housework or school activities   2/4  2. Your usual hobbies, sporting     0/4  3. Getting in and out of tub      2/4  4. Walking between rooms      2/4  5. Putting on shoes or socks      1/4  6. Squatting        0/4  7. Lifting an object from the ground      2/4  8. Performing light activities around the home   2/4  9. Performing heavy activities around the home   0/4  10. Getting in and out of car      2/4  11. Walking 2 blocks       2/4  12.Walking a mile       0/4  13. Getting up and down 1 flight of stairs    1/4  14. Standing for 1 hour      0/4  15. Sitting for an hour       1/4  16. Running on even ground      0/4  17. Running on uneven ground     0/4  18. Making sharp turns when  running fast    0/4  19. Hopping        1/4  20. Rolling over in bed       4/4          Total: 22/80         Patient reports 72.5% disability based on score of the Lower Extremity Functional Scale.    Tenderness to palpation:  Anterior and medial L ankle    Other: SLS L LE 2 seconds, R LE 22 seconds     ASSESSMENT:   Patient continues to tolerate increased resistance and activity with therex.  Stretching of the L talus into DF while in lunge position yields the most results for normal ambulation.         Initial:  The patient is a 21 y.o. year old male who presents to physical therapy with complaints of L ankle pain.  Patient's impairments include decreased L LE strength in the ankle, knee and hip, decreased L ankle ROM, and L ankle swelling.  Patient also limited with balance and SLS time on the L LE compared to the R LE  These impairments are limiting patient's ability to participate in sports and recreational activities as well as increased standing and walking.  Patient's prognosis is good to meet the following goals.  Patient will benefit from skilled physical therapy intervention to address the above listed deficits and below listed goals to increase patient's functional mobility and stability.    Short Term Goals:    1.  Patient will be educated on HEP and report compliance 2 days per week.  2.  Patient will increase strength of the L LE by 1/3 MMT grade to increase functional stability.  3.  Patient will decrease LEFS disability to less than 55% to evidence increased functional mobility.  4.  Patient will have decreased swelling in the L ankle to = that of the R ankle to increase functional mobility.    Long Term Goals:  1.  Patient will increase strength of the L LE by 1/2 MMT grade to increase functional stability.  2.  Patient will decrease LEFS disability to less than 40% to evidence increased functional mobility.  3.  Patient will increase L LE ROM to WFL to increase functional  "mobility.    Co-morbidities which may impact the plan of care and potentially impede the patient's progress in therapy include: NA    The patient's clinical presentation is stable.    TREATMENT PROVIDED:  Ultrasound: (deferred today)  20%, 1.2 w/cm2, 3.3 mHz to the dorsum of the L ankle at the talus.     Manual therapy: (5 minutes - no charge) Talus glides to increase ROM into DF while in lunge position with the R LE forward.      Therapeutic exercise: (40 minutes)  Bike 5 minutes level 3  Single leg press, 7 bands, 3x10 each LE    Gastroc stretch on slant board, 3x30" each   Standing hip abd/ext/flex, black band, 3x10 each B LE   Alt heel/toe raises on foam, 30x  L SLS 3x30" on foam   Toe walk, 2x30 feet  Heel walk, 4x30 feet   Supine HSS with strap, 3x30" B LE   Prone quad stretch, B LE, 3x30"   Rebounder, 3 way with L LE, 2#, 30x each     Deferred today:   Ankle 4-way, green band, 2x10  BAPS board, level 2 DF/PF, inv/ever, CW/CCW, 30x each         PLAN:  Patient will benefit from physical therapy (2) x/week for (8) weeks including manual therapy, therapeutic exercise, functional activities, modalities, and patient education.    Thank you for this referral.    These services are reasonable and necessary for the conditions set forth above while under my care.    "

## 2017-07-19 ENCOUNTER — OFFICE VISIT (OUTPATIENT)
Dept: ORTHOPEDICS | Facility: CLINIC | Age: 22
End: 2017-07-19
Payer: MEDICAID

## 2017-07-19 VITALS — RESPIRATION RATE: 12 BRPM | DIASTOLIC BLOOD PRESSURE: 72 MMHG | HEART RATE: 59 BPM | SYSTOLIC BLOOD PRESSURE: 130 MMHG

## 2017-07-19 PROCEDURE — 99999 PR PBB SHADOW E&M-EST. PATIENT-LVL III: CPT | Mod: PBBFAC,,, | Performed by: PHYSICIAN ASSISTANT

## 2017-07-19 PROCEDURE — 99212 OFFICE O/P EST SF 10 MIN: CPT | Mod: S$PBB,,, | Performed by: PHYSICIAN ASSISTANT

## 2017-07-19 PROCEDURE — 99213 OFFICE O/P EST LOW 20 MIN: CPT | Mod: PBBFAC | Performed by: PHYSICIAN ASSISTANT

## 2017-07-19 NOTE — PROGRESS NOTES
"Subjective:      Patient ID: Kayy Navas is a 21 y.o. male.    Chief Complaint: Follow-up of the Left Ankle    HPI  Patient was asked to clinic for follow-up regarding his left ankle.  He has been in outpatient physical therapy and is doing very well.  He does not complain of any pain currently.  He has been out of his cam walking boot for exactly 1 week now.    The patient states that he injured his ankle while playing basketball at school over 3 months ago. He tripped over another player's foot and his ankle "rolled on both sides."      Review of Systems   Constitution: Negative for chills, fever and night sweats.   Respiratory: Negative for cough, shortness of breath and wheezing.    Musculoskeletal: Negative for joint pain and joint swelling.   Gastrointestinal: Negative for diarrhea, nausea and vomiting.   Neurological: Negative for brief paralysis.   Psychiatric/Behavioral: Negative for altered mental status.         Objective:            General    Constitutional: He is oriented to person, place, and time. He appears well-developed and well-nourished.   Neck: Normal range of motion.   Cardiovascular: Normal rate and regular rhythm.    Pulmonary/Chest: Effort normal.   Abdominal: Soft.   Neurological: He is alert and oriented to person, place, and time.   Psychiatric: He has a normal mood and affect. His behavior is normal.     General Musculoskeletal Exam   Gait: normal     Right Ankle/Foot Exam     Range of Motion   The patient has normal right ankle ROM.    Left Ankle/Foot Exam     Range of Motion   The patient has normal left ankle ROM.   First MTP Joint: normal    Alignment   Hindfoot Alignment: neutral  Midfoot Alignment: normal    Muscle Strength   The patient has normal left ankle strength.    Tests   Anterior drawer: negative  Heel Walk: able to perform  Tiptoe Walk: able to perform  External Rotation Test: negative  Squeeze Test: absent    Other   Peroneal Subluxation: negative    Comments:  No " pain or TTP          The patient is able to demonstrate good active and passive range of motion as well as strength of left ankle.            Assessment:       Encounter Diagnosis   Name Primary?    Grade 3 ankle sprain, left, subsequent encounter Yes          Plan:       Kayy was seen today for follow-up.    Diagnoses and all orders for this visit:    Grade 3 ankle sprain, left, subsequent encounter           The patient does not require any further immobilization such as a cam walking boot for his ankle.  He would like to complete physical therapy  over the next 4 weeks.  After he has completed all of his  visits, I do not believe he will require any other referrals to therapy.     The patient was instructed that he should be very careful and to avoid any further injuries which could be cause set-backs for him. He wants to return to sports some time next month. Should he have any concerns after he has completed therapy over the next few weeks, he should report to the orthopedic clinic for additional evaluation.              Luz Elena Nathan PA-C

## 2017-07-24 ENCOUNTER — CLINICAL SUPPORT (OUTPATIENT)
Dept: REHABILITATION | Facility: HOSPITAL | Age: 22
End: 2017-07-24
Attending: ORTHOPAEDIC SURGERY
Payer: MEDICAID

## 2017-07-24 PROCEDURE — 97110 THERAPEUTIC EXERCISES: CPT | Performed by: PHYSICAL THERAPIST

## 2017-07-26 NOTE — PROGRESS NOTES
PHYSICAL THERAPY DAILY PROGRESS NOTE     Referring Provider:  Dr. Benigno Moses    Diagnosis:       ICD-10-CM ICD-9-CM    1. Grade 3 ankle sprain, left, subsequent encounter S93.402D V58.89      845.00           Orders:  Evaluate and Treat    Date of Initial Evaluation: 17    Orders : 17    Authorization expires: 17    Visit # 9 (8 of 16 authorized)    SUBJECTIVE:  Patient reports he has been doing good.  He feels stiff when he first gets up to walk but this eases over time.       Initial:  Patient reports injury on 17 while he was playing basketball.  Another player ran in front of him while he was going to shoot the ball which caused him to fall and roll his L ankle.  He was seen in the ER at [facility] due to swelling and pain and at that time he was put in a walking boot.  He states this was too painful and then was see at [facility] with a posterior splint put on the foot.  He saw orthopedics here at [facility] on 17 for follow-up.  He had a MRI done on  and again saw orthopedics on  with placement of a cast on the L LE with NWB orders due to Grade 3 ATFL, calcaneofibular and deltoid ligament tears as well as low-grade partial-thickness of posterior tibialis tendon tear.  He had the cast removed on 17 with use of boot after this.  He presents today with stiffness and states pain after getting up from sitting for long periods.  Yestreday he went to his cousins house without his boot on with just tennis shoes and had increased pain after sitting for long time.  He presents today without boot on the foot.      PAST MEDICAL HISTORY:    Past Medical History:   Diagnosis Date    Asthma        CURRENT MEDICATIONS:    Current Outpatient Prescriptions:     ALBUTEROL, REFILL, INHL, Inhale into the lungs., Disp: , Rfl:     fluticasone-salmeterol 100-50 mcg/dose (ADVAIR) 100-50 mcg/dose diskus inhaler, Inhale 1 puff into the lungs 2 (two) times daily., Disp: , Rfl:     hyoscyamine  (ANASPAZ,LEVSIN) 0.125 mg Tab, Take 1 tablet (125 mcg total) by mouth every 6 (six) hours as needed (abdominal cramping)., Disp: 15 tablet, Rfl: 0    ibuprofen (ADVIL,MOTRIN) 600 MG tablet, Take 1 tablet (600 mg total) by mouth 3 (three) times daily., Disp: 40 tablet, Rfl: 1    OBJECTIVE:  Pain: 5-6 /10, located anterior/middle ankle, described as sore/achy    Sensation: intact to light touch     Ankle ROM:  Plantar Flexion  Left  20 to 30 degrees  Right 60 degrees        Dorsi Flexion   Left -5 to 5 degrees   Right 10 degrees      Inversion   Left 5 to 15 degrees  Right 20 degrees       Eversion   Left       Strength:  Anterior tibialis   Left 3+   Right 4+      Posterior tibialis  Left 3+   Right 4+     Gastrocnemius  Left 3   Right 4+      Soleus    Left 3   Right 4+     Peroneals   Left 3+   Right 4+       Hamstrings    Left 4-   Right 5-     Quadriceps   Left 4-   Right 4+     Iliopsoas   Left 3+   Right 4-     Gluteus Medius  Left 3+   Right 4+     Gluteus Charlie  Left 3+   Right 4      Girth:   Malleoli    Left 25.5 cm   Right 25 cm      Figure 8    Left 52.5 cm   Right 50.5 cm            Function:  LOWER EXTREMITY FUNCTIONAL SCALE                EVAL    1. Any of your usual work, housework or school activities   2/4  2. Your usual hobbies, sporting     0/4  3. Getting in and out of tub      2/4  4. Walking between rooms      2/4  5. Putting on shoes or socks      1/4  6. Squatting        0/4  7. Lifting an object from the ground      2/4  8. Performing light activities around the home   2/4  9. Performing heavy activities around the home   0/4  10. Getting in and out of car      2/4  11. Walking 2 blocks       2/4  12.Walking a mile       0/4  13. Getting up and down 1 flight of stairs    1/4  14. Standing for 1 hour      0/4  15. Sitting for an hour       1/4  16. Running on even ground      0/4  17. Running on uneven ground     0/4  18. Making sharp turns when running fast    0/4  19.  Hopping        1/4  20. Rolling over in bed       4/4          Total: 22/80         Patient reports 72.5% disability based on score of the Lower Extremity Functional Scale.    Tenderness to palpation:  Anterior and medial L ankle    Other: SLS L LE 2 seconds, R LE 22 seconds     ASSESSMENT:  Patient tolerated progression of BAPS board for ROM well.  Plan to progress resistance more on next visit.          Initial:  The patient is a 21 y.o. year old male who presents to physical therapy with complaints of L ankle pain.  Patient's impairments include decreased L LE strength in the ankle, knee and hip, decreased L ankle ROM, and L ankle swelling.  Patient also limited with balance and SLS time on the L LE compared to the R LE  These impairments are limiting patient's ability to participate in sports and recreational activities as well as increased standing and walking.  Patient's prognosis is good to meet the following goals.  Patient will benefit from skilled physical therapy intervention to address the above listed deficits and below listed goals to increase patient's functional mobility and stability.    Short Term Goals:    1.  Patient will be educated on HEP and report compliance 2 days per week.  2.  Patient will increase strength of the L LE by 1/3 MMT grade to increase functional stability.  3.  Patient will decrease LEFS disability to less than 55% to evidence increased functional mobility.  4.  Patient will have decreased swelling in the L ankle to = that of the R ankle to increase functional mobility.    Long Term Goals:  1.  Patient will increase strength of the L LE by 1/2 MMT grade to increase functional stability.  2.  Patient will decrease LEFS disability to less than 40% to evidence increased functional mobility.  3.  Patient will increase L LE ROM to WFL to increase functional mobility.    Co-morbidities which may impact the plan of care and potentially impede the patient's progress in therapy  "include: NA    The patient's clinical presentation is stable.    TREATMENT PROVIDED:  Ultrasound: (deferred today)  20%, 1.2 w/cm2, 3.3 mHz to the dorsum of the L ankle at the talus.     Manual therapy: (deferred today) Talus glides to increase ROM into DF while in lunge position with the R LE forward.      Therapeutic exercise: (50 minutes)  Bike 5 minutes level 3  Single leg press, 7 bands, 3x10 each LE    Gastroc stretch on slant board, 3x30" each   Standing hip abd/ext/flex, black band, 3x10 each B LE   Alt heel/toe raises on foam, 30x  L SLS 3x30" on foam   Toe walk, 2x30 feet  Heel walk, 2x30 feet   Supine HSS with strap, 3x30" B LE   Prone quad stretch, B LE, 3x30"   Rebounder, 3 way with L LE, 4#, 30x each   BAPS board, level 2 DF/PF, inv/ever, CW/CCW, 30x each       Deferred today:   Ankle 4-way, green band, 2x10          PLAN:  Patient will benefit from physical therapy (2) x/week for (8) weeks including manual therapy, therapeutic exercise, functional activities, modalities, and patient education.    Thank you for this referral.    These services are reasonable and necessary for the conditions set forth above while under my care.    "

## 2017-07-27 ENCOUNTER — CLINICAL SUPPORT (OUTPATIENT)
Dept: REHABILITATION | Facility: HOSPITAL | Age: 22
End: 2017-07-27
Attending: ORTHOPAEDIC SURGERY
Payer: MEDICAID

## 2017-07-27 PROCEDURE — 97110 THERAPEUTIC EXERCISES: CPT | Performed by: PHYSICAL THERAPIST

## 2017-07-28 NOTE — PROGRESS NOTES
PHYSICAL THERAPY DAILY PROGRESS NOTE     Referring Provider:  Dr. Benigno Moses    Diagnosis:       ICD-10-CM ICD-9-CM    1. Grade 3 ankle sprain, left, subsequent encounter S93.402D V58.89      845.00           Orders:  Evaluate and Treat    Date of Initial Evaluation: 17    Orders : 17    Authorization expires: 17    Visit # 10 (9 of 16 authorized)    SUBJECTIVE:  Patient reports his ankle is feeling stiff today.   He hasn't been having pain.      Initial:  Patient reports injury on 17 while he was playing basketball.  Another player ran in front of him while he was going to shoot the ball which caused him to fall and roll his L ankle.  He was seen in the ER at [facility] due to swelling and pain and at that time he was put in a walking boot.  He states this was too painful and then was see at [facility] with a posterior splint put on the foot.  He saw orthopedics here at [facility] on 17 for follow-up.  He had a MRI done on  and again saw orthopedics on  with placement of a cast on the L LE with NWB orders due to Grade 3 ATFL, calcaneofibular and deltoid ligament tears as well as low-grade partial-thickness of posterior tibialis tendon tear.  He had the cast removed on 17 with use of boot after this.  He presents today with stiffness and states pain after getting up from sitting for long periods.  Yestreday he went to his cousins house without his boot on with just tennis shoes and had increased pain after sitting for long time.  He presents today without boot on the foot.      PAST MEDICAL HISTORY:    Past Medical History:   Diagnosis Date    Asthma        CURRENT MEDICATIONS:    Current Outpatient Prescriptions:     ALBUTEROL, REFILL, INHL, Inhale into the lungs., Disp: , Rfl:     fluticasone-salmeterol 100-50 mcg/dose (ADVAIR) 100-50 mcg/dose diskus inhaler, Inhale 1 puff into the lungs 2 (two) times daily., Disp: , Rfl:     hyoscyamine (ANASPAZ,LEVSIN) 0.125 mg Tab, Take 1  tablet (125 mcg total) by mouth every 6 (six) hours as needed (abdominal cramping)., Disp: 15 tablet, Rfl: 0    ibuprofen (ADVIL,MOTRIN) 600 MG tablet, Take 1 tablet (600 mg total) by mouth 3 (three) times daily., Disp: 40 tablet, Rfl: 1    OBJECTIVE:  Pain: 5-6 /10, located anterior/middle ankle, described as sore/achy    Sensation: intact to light touch     Ankle ROM:  Plantar Flexion  Left  20 to 30 degrees  Right 60 degrees        Dorsi Flexion   Left -5 to 5 degrees   Right 10 degrees      Inversion   Left 5 to 15 degrees  Right 20 degrees       Eversion   Left       Strength:  Anterior tibialis   Left 3+   Right 4+      Posterior tibialis  Left 3+   Right 4+     Gastrocnemius  Left 3   Right 4+      Soleus    Left 3   Right 4+     Peroneals   Left 3+   Right 4+       Hamstrings    Left 4-   Right 5-     Quadriceps   Left 4-   Right 4+     Iliopsoas   Left 3+   Right 4-     Gluteus Medius  Left 3+   Right 4+     Gluteus Charlie  Left 3+   Right 4      Girth:   Malleoli    Left 25.5 cm   Right 25 cm      Figure 8    Left 52.5 cm   Right 50.5 cm            Function:  LOWER EXTREMITY FUNCTIONAL SCALE                EVAL    1. Any of your usual work, housework or school activities   2/4  2. Your usual hobbies, sporting     0/4  3. Getting in and out of tub      2/4  4. Walking between rooms      2/4  5. Putting on shoes or socks      1/4  6. Squatting        0/4  7. Lifting an object from the ground      2/4  8. Performing light activities around the home   2/4  9. Performing heavy activities around the home   0/4  10. Getting in and out of car      2/4  11. Walking 2 blocks       2/4  12.Walking a mile       0/4  13. Getting up and down 1 flight of stairs    1/4  14. Standing for 1 hour      0/4  15. Sitting for an hour       1/4  16. Running on even ground      0/4  17. Running on uneven ground     0/4  18. Making sharp turns when running fast    0/4  19. Hopping        1/4  20. Rolling over in  bed       4/4          Total: 22/80         Patient reports 72.5% disability based on score of the Lower Extremity Functional Scale.    Tenderness to palpation:  Anterior and medial L ankle    Other: SLS L LE 2 seconds, R LE 22 seconds     ASSESSMENT:    Patient had difficulty performing squats and lunges and required VC and demonstration for form and technique       Initial:  The patient is a 21 y.o. year old male who presents to physical therapy with complaints of L ankle pain.  Patient's impairments include decreased L LE strength in the ankle, knee and hip, decreased L ankle ROM, and L ankle swelling.  Patient also limited with balance and SLS time on the L LE compared to the R LE  These impairments are limiting patient's ability to participate in sports and recreational activities as well as increased standing and walking.  Patient's prognosis is good to meet the following goals.  Patient will benefit from skilled physical therapy intervention to address the above listed deficits and below listed goals to increase patient's functional mobility and stability.    Short Term Goals:    1.  Patient will be educated on HEP and report compliance 2 days per week.  2.  Patient will increase strength of the L LE by 1/3 MMT grade to increase functional stability.  3.  Patient will decrease LEFS disability to less than 55% to evidence increased functional mobility.  4.  Patient will have decreased swelling in the L ankle to = that of the R ankle to increase functional mobility.    Long Term Goals:  1.  Patient will increase strength of the L LE by 1/2 MMT grade to increase functional stability.  2.  Patient will decrease LEFS disability to less than 40% to evidence increased functional mobility.  3.  Patient will increase L LE ROM to WFL to increase functional mobility.    Co-morbidities which may impact the plan of care and potentially impede the patient's progress in therapy include: NA    The patient's clinical  "presentation is stable.    TREATMENT PROVIDED:  Ultrasound: (deferred today)  20%, 1.2 w/cm2, 3.3 mHz to the dorsum of the L ankle at the talus.     Manual therapy: (5 minutes - no charge) STM and strumming to the R lateral ankle and at the talus to decrease tissue tension.        Therapeutic exercise: (50 minutes)  Bike 5 minutes level 3  Gastroc stretch on slant board, 3x30" each   Standing hip abd/ext/flex, black band, 3x10 each B LE   Toe walk, 4x30 feet  Heel walk, 4x30 feet   Supine HSS with strap, 3x30" B LE   Prone quad stretch, B LE, 3x30"   Rebounder, 3 way with L LE, 4#, 30x each   Squats 2x10 at parallel bars  Lunges, 2x5 B LE forward   Ankle 4-way, blue band, 2x10      Deferred today:   Single leg press, 7 bands, 3x10 each LE    Alt heel/toe raises on foam, 30x  L SLS 3x30" on foam   BAPS board, level 2 DF/PF, inv/ever, CW/CCW, 30x each         PLAN:  Patient will benefit from physical therapy (2) x/week for (8) weeks including manual therapy, therapeutic exercise, functional activities, modalities, and patient education.    Thank you for this referral.    These services are reasonable and necessary for the conditions set forth above while under my care.    "

## 2017-07-31 ENCOUNTER — CLINICAL SUPPORT (OUTPATIENT)
Dept: REHABILITATION | Facility: HOSPITAL | Age: 22
End: 2017-07-31
Attending: ORTHOPAEDIC SURGERY
Payer: MEDICAID

## 2017-07-31 PROCEDURE — 97140 MANUAL THERAPY 1/> REGIONS: CPT | Performed by: PHYSICAL THERAPIST

## 2017-07-31 PROCEDURE — 97110 THERAPEUTIC EXERCISES: CPT | Performed by: PHYSICAL THERAPIST

## 2017-07-31 NOTE — PROGRESS NOTES
PHYSICAL THERAPY DAILY PROGRESS NOTE     Referring Provider:  Dr. Benigno Moses    Diagnosis:       ICD-10-CM ICD-9-CM    1. Grade 3 ankle sprain, left, subsequent encounter S93.402D V58.89      845.00           Orders:  Evaluate and Treat    Date of Initial Evaluation: 17    Orders : 17    Authorization expires: 17    Visit # 11 (10 of 16 authorized)    SUBJECTIVE:  Patient states he had stiffness in his ankle yesterday and this morning from increased walking on Saturday.      Initial:  Patient reports injury on 17 while he was playing basketball.  Another player ran in front of him while he was going to shoot the ball which caused him to fall and roll his L ankle.  He was seen in the ER at [facility] due to swelling and pain and at that time he was put in a walking boot.  He states this was too painful and then was see at [facility] with a posterior splint put on the foot.  He saw orthopedics here at [facility] on 17 for follow-up.  He had a MRI done on  and again saw orthopedics on  with placement of a cast on the L LE with NWB orders due to Grade 3 ATFL, calcaneofibular and deltoid ligament tears as well as low-grade partial-thickness of posterior tibialis tendon tear.  He had the cast removed on 17 with use of boot after this.  He presents today with stiffness and states pain after getting up from sitting for long periods.  Yestreday he went to his cousins house without his boot on with just tennis shoes and had increased pain after sitting for long time.  He presents today without boot on the foot.      PAST MEDICAL HISTORY:    Past Medical History:   Diagnosis Date    Asthma        CURRENT MEDICATIONS:    Current Outpatient Prescriptions:     ALBUTEROL, REFILL, INHL, Inhale into the lungs., Disp: , Rfl:     fluticasone-salmeterol 100-50 mcg/dose (ADVAIR) 100-50 mcg/dose diskus inhaler, Inhale 1 puff into the lungs 2 (two) times daily., Disp: , Rfl:     hyoscyamine  (ANASPAZ,LEVSIN) 0.125 mg Tab, Take 1 tablet (125 mcg total) by mouth every 6 (six) hours as needed (abdominal cramping)., Disp: 15 tablet, Rfl: 0    ibuprofen (ADVIL,MOTRIN) 600 MG tablet, Take 1 tablet (600 mg total) by mouth 3 (three) times daily., Disp: 40 tablet, Rfl: 1    OBJECTIVE:  Pain: 5-6 /10, located anterior/middle ankle, described as sore/achy    Sensation: intact to light touch     Ankle ROM:  Plantar Flexion  Left  20 to 30 degrees  Right 60 degrees        Dorsi Flexion   Left -5 to 5 degrees   Right 10 degrees      Inversion   Left 5 to 15 degrees  Right 20 degrees       Eversion   Left       Strength:  Anterior tibialis   Left 3+   Right 4+      Posterior tibialis  Left 3+   Right 4+     Gastrocnemius  Left 3   Right 4+      Soleus    Left 3   Right 4+     Peroneals   Left 3+   Right 4+       Hamstrings    Left 4-   Right 5-     Quadriceps   Left 4-   Right 4+     Iliopsoas   Left 3+   Right 4-     Gluteus Medius  Left 3+   Right 4+     Gluteus Charlie  Left 3+   Right 4      Girth:   Malleoli    Left 25.5 cm   Right 25 cm      Figure 8    Left 52.5 cm   Right 50.5 cm            Function:  LOWER EXTREMITY FUNCTIONAL SCALE                EVAL    1. Any of your usual work, housework or school activities   2/4  2. Your usual hobbies, sporting     0/4  3. Getting in and out of tub      2/4  4. Walking between rooms      2/4  5. Putting on shoes or socks      1/4  6. Squatting        0/4  7. Lifting an object from the ground      2/4  8. Performing light activities around the home   2/4  9. Performing heavy activities around the home   0/4  10. Getting in and out of car      2/4  11. Walking 2 blocks       2/4  12.Walking a mile       0/4  13. Getting up and down 1 flight of stairs    1/4  14. Standing for 1 hour      0/4  15. Sitting for an hour       1/4  16. Running on even ground      0/4  17. Running on uneven ground     0/4  18. Making sharp turns when running fast    0/4  19.  Hopping        1/4  20. Rolling over in bed       4/4          Total: 22/80         Patient reports 72.5% disability based on score of the Lower Extremity Functional Scale.    Tenderness to palpation:  Anterior and medial L ankle    Other: SLS L LE 2 seconds, R LE 22 seconds     ASSESSMENT:   Patient continues to be challenged with squat form and technique.  He was able to perform with max VC and with tactile cues for assist.  Educated patient to perform squats at home against wall and facing chair to maintain alignment of the knees over the feet.      Initial:  The patient is a 21 y.o. year old male who presents to physical therapy with complaints of L ankle pain.  Patient's impairments include decreased L LE strength in the ankle, knee and hip, decreased L ankle ROM, and L ankle swelling.  Patient also limited with balance and SLS time on the L LE compared to the R LE  These impairments are limiting patient's ability to participate in sports and recreational activities as well as increased standing and walking.  Patient's prognosis is good to meet the following goals.  Patient will benefit from skilled physical therapy intervention to address the above listed deficits and below listed goals to increase patient's functional mobility and stability.    Short Term Goals:    1.  Patient will be educated on HEP and report compliance 2 days per week.  2.  Patient will increase strength of the L LE by 1/3 MMT grade to increase functional stability.  3.  Patient will decrease LEFS disability to less than 55% to evidence increased functional mobility.  4.  Patient will have decreased swelling in the L ankle to = that of the R ankle to increase functional mobility.    Long Term Goals:  1.  Patient will increase strength of the L LE by 1/2 MMT grade to increase functional stability.  2.  Patient will decrease LEFS disability to less than 40% to evidence increased functional mobility.  3.  Patient will increase L LE ROM to WFL  "to increase functional mobility.    Co-morbidities which may impact the plan of care and potentially impede the patient's progress in therapy include: NA    The patient's clinical presentation is stable.    TREATMENT PROVIDED:  Ultrasound: (deferred today)  20%, 1.2 w/cm2, 3.3 mHz to the dorsum of the L ankle at the talus.     Manual therapy: (8 minutes) IASTM and strumming to the R lateral ankle and at the talus to decrease tissue tension.        Therapeutic exercise: (40 minutes)  Bike 5 minutes level 3  Supine HSS with strap, 3x30" B LE   Prone quad stretch, B LE, 3x30"   Single leg press, 7 bands, 3x10 each LE    Gastroc stretch on slant board, 3x30" each   Hip machine, abd/flex/ext, 80#, 2x10 B LE   Toe walk, 4x30 feet  Heel walk, 4x30 feet   Squats 2x10 at parallel bars  Squats at chair with red band at knees, 2x10  Squat holds at chair trials, up to 12" one rep        Deferred today:   Rebounder, 3 way with L LE, 4#, 30x each   Lunges, 2x5 B LE forward   Ankle 4-way, blue band, 2x10  Standing hip abd/ext/flex, black band, 3x10 each B LE   Alt heel/toe raises on foam, 30x  L SLS 3x30" on foam   BAPS board, level 2 DF/PF, inv/ever, CW/CCW, 30x each         PLAN:  Patient will benefit from physical therapy (2) x/week for (8) weeks including manual therapy, therapeutic exercise, functional activities, modalities, and patient education.    Thank you for this referral.    These services are reasonable and necessary for the conditions set forth above while under my care.    "

## 2017-08-03 ENCOUNTER — CLINICAL SUPPORT (OUTPATIENT)
Dept: REHABILITATION | Facility: HOSPITAL | Age: 22
End: 2017-08-03
Attending: ORTHOPAEDIC SURGERY
Payer: MEDICAID

## 2017-08-03 PROCEDURE — 97110 THERAPEUTIC EXERCISES: CPT | Performed by: PHYSICAL THERAPIST

## 2017-08-04 NOTE — PROGRESS NOTES
PHYSICAL THERAPY DAILY PROGRESS NOTE     Referring Provider:  Dr. Benigno Moses    Diagnosis:       ICD-10-CM ICD-9-CM    1. Grade 3 ankle sprain, left, subsequent encounter S93.402D V58.89      845.00           Orders:  Evaluate and Treat    Date of Initial Evaluation: 17    Orders : 17    Authorization expires: 17    Visit # 12 (11 of 16 authorized)    SUBJECTIVE:  Patient states he is still having stiffness off and on.      Initial:  Patient reports injury on 17 while he was playing basketball.  Another player ran in front of him while he was going to shoot the ball which caused him to fall and roll his L ankle.  He was seen in the ER at [facility] due to swelling and pain and at that time he was put in a walking boot.  He states this was too painful and then was see at [facility] with a posterior splint put on the foot.  He saw orthopedics here at [facility] on 17 for follow-up.  He had a MRI done on  and again saw orthopedics on  with placement of a cast on the L LE with NWB orders due to Grade 3 ATFL, calcaneofibular and deltoid ligament tears as well as low-grade partial-thickness of posterior tibialis tendon tear.  He had the cast removed on 17 with use of boot after this.  He presents today with stiffness and states pain after getting up from sitting for long periods.  Yestreday he went to his cousins house without his boot on with just tennis shoes and had increased pain after sitting for long time.  He presents today without boot on the foot.      PAST MEDICAL HISTORY:    Past Medical History:   Diagnosis Date    Asthma        CURRENT MEDICATIONS:    Current Outpatient Prescriptions:     ALBUTEROL, REFILL, INHL, Inhale into the lungs., Disp: , Rfl:     fluticasone-salmeterol 100-50 mcg/dose (ADVAIR) 100-50 mcg/dose diskus inhaler, Inhale 1 puff into the lungs 2 (two) times daily., Disp: , Rfl:     hyoscyamine (ANASPAZ,LEVSIN) 0.125 mg Tab, Take 1 tablet (125 mcg total) by  mouth every 6 (six) hours as needed (abdominal cramping)., Disp: 15 tablet, Rfl: 0    ibuprofen (ADVIL,MOTRIN) 600 MG tablet, Take 1 tablet (600 mg total) by mouth 3 (three) times daily., Disp: 40 tablet, Rfl: 1    OBJECTIVE:  Pain: 5-6 /10, located anterior/middle ankle, described as sore/achy    Sensation: intact to light touch     Ankle ROM:  Plantar Flexion  Left  20 to 30 degrees  Right 60 degrees        Dorsi Flexion   Left -5 to 5 degrees   Right 10 degrees      Inversion   Left 5 to 15 degrees  Right 20 degrees       Eversion   Left       Strength:  Anterior tibialis   Left 4   Right 4+      Posterior tibialis  Left 4   Right 4+     Gastrocnemius  Left 4   Right 4+      Soleus    Left 4   Right 4+     Peroneals   Left 4-   Right 4+       Hamstrings    Left 4-   Right 5-     Quadriceps   Left 4-   Right 4+     Iliopsoas   Left 3+   Right 4-     Gluteus Medius  Left 3+   Right 4+     Gluteus Charlie  Left 3+   Right 4      Girth:   Malleoli    Left 25.5 cm   Right 25 cm      Figure 8    Left 51 cm   Right 50.5 cm            Function:  LOWER EXTREMITY FUNCTIONAL SCALE                EVAL    1. Any of your usual work, housework or school activities   2/4  2. Your usual hobbies, sporting     0/4  3. Getting in and out of tub      2/4  4. Walking between rooms      2/4  5. Putting on shoes or socks      1/4  6. Squatting        0/4  7. Lifting an object from the ground      2/4  8. Performing light activities around the home   2/4  9. Performing heavy activities around the home   0/4  10. Getting in and out of car      2/4  11. Walking 2 blocks       2/4  12.Walking a mile       0/4  13. Getting up and down 1 flight of stairs    1/4  14. Standing for 1 hour      0/4  15. Sitting for an hour       1/4  16. Running on even ground      0/4  17. Running on uneven ground     0/4  18. Making sharp turns when running fast    0/4  19. Hopping        1/4  20. Rolling over in  bed       4/4          Total: 22/80         Patient reports 72.5% disability based on score of the Lower Extremity Functional Scale.    Tenderness to palpation:  Anterior and medial L ankle    Other: SLS L LE 25 seconds, R LE 30 seconds     ASSESSMENT:  Patient performed squats with improved technique.  He still had difficulty with lunges with hip strengthening and stretching performed.  He required max VC and TC for performance of pelvic tilts and hip stretching.      Initial:  The patient is a 21 y.o. year old male who presents to physical therapy with complaints of L ankle pain.  Patient's impairments include decreased L LE strength in the ankle, knee and hip, decreased L ankle ROM, and L ankle swelling.  Patient also limited with balance and SLS time on the L LE compared to the R LE  These impairments are limiting patient's ability to participate in sports and recreational activities as well as increased standing and walking.  Patient's prognosis is good to meet the following goals.  Patient will benefit from skilled physical therapy intervention to address the above listed deficits and below listed goals to increase patient's functional mobility and stability.    Short Term Goals:    1.  Patient will be educated on HEP and report compliance 2 days per week.  2.  Patient will increase strength of the L LE by 1/3 MMT grade to increase functional stability.  3.  Patient will decrease LEFS disability to less than 55% to evidence increased functional mobility.  4.  Patient will have decreased swelling in the L ankle to = that of the R ankle to increase functional mobility.    Long Term Goals:  1.  Patient will increase strength of the L LE by 1/2 MMT grade to increase functional stability.  2.  Patient will decrease LEFS disability to less than 40% to evidence increased functional mobility.  3.  Patient will increase L LE ROM to WFL to increase functional mobility.    Co-morbidities which may impact the plan of care  "and potentially impede the patient's progress in therapy include: NA    The patient's clinical presentation is stable.    TREATMENT PROVIDED:  Ultrasound: (deferred today)  20%, 1.2 w/cm2, 3.3 mHz to the dorsum of the L ankle at the talus.     Manual therapy: (deferred today) IASTM and strumming to the R lateral ankle and at the talus to decrease tissue tension.        Therapeutic exercise: (55 minutes)  Bike 5 minutes level 3  Supine HSS with strap, 3x30" B LE   Prone quad stretch, B LE, 3x30"   Gastroc stretch on slant board, 3x30" each   Hip machine, abd/flex/ext, 90#, 2x10 B LE   Toe walk, 4x30 feet  Heel walk, 4x30 feet   Squats 2x10 at parallel bars  Lunges, 1x5 B LE forward   Bridges, 20x, blue band at knees  Pelvic tilts, 20x   Cat/cow in quadruped with max cues, 5 reps  Quadruped hip abd, 5x B LE  B piriformis stretch, 2x30"       Deferred today:   Single leg press, 7 bands, 3x10 each LE    Squats at chair with red band at knees, 2x10  Squat holds at chair trials, up to 12" one rep  Rebounder, 3 way with L LE, 4#, 30x each   Ankle 4-way, blue band, 2x10  Standing hip abd/ext/flex, black band, 3x10 each B LE   Alt heel/toe raises on foam, 30x  L SLS 3x30" on foam   BAPS board, level 2 DF/PF, inv/ever, CW/CCW, 30x each         PLAN:  Patient will benefit from physical therapy (2) x/week for (8) weeks including manual therapy, therapeutic exercise, functional activities, modalities, and patient education.    Thank you for this referral.    These services are reasonable and necessary for the conditions set forth above while under my care.    "

## 2017-08-11 ENCOUNTER — CLINICAL SUPPORT (OUTPATIENT)
Dept: REHABILITATION | Facility: HOSPITAL | Age: 22
End: 2017-08-11
Attending: ORTHOPAEDIC SURGERY
Payer: MEDICAID

## 2017-08-11 PROCEDURE — 97140 MANUAL THERAPY 1/> REGIONS: CPT | Performed by: PHYSICAL THERAPIST

## 2017-08-11 PROCEDURE — 97110 THERAPEUTIC EXERCISES: CPT | Performed by: PHYSICAL THERAPIST

## 2017-08-11 NOTE — PROGRESS NOTES
PHYSICAL THERAPY DAILY PROGRESS NOTE     Referring Provider:  Dr. Benigno Moses    Diagnosis:       ICD-10-CM ICD-9-CM    1. Grade 3 ankle sprain, left, subsequent encounter S93.402D V58.89      845.00           Orders:  Evaluate and Treat    Date of Initial Evaluation: 17    Orders : 17    Authorization expires: 17    Visit # 13 (12 of 16 authorized)    SUBJECTIVE:  Patient states he is feeling good with some stiffness in the dorsum of the foot and along the medial border.  He reports trying to do squats and lunges at home but stopped as he wasn't sure he was doing them correctly.     Initial:  Patient reports injury on 17 while he was playing basketball.  Another player ran in front of him while he was going to shoot the ball which caused him to fall and roll his L ankle.  He was seen in the ER at [facility] due to swelling and pain and at that time he was put in a walking boot.  He states this was too painful and then was see at [facility] with a posterior splint put on the foot.  He saw orthopedics here at [facility] on 17 for follow-up.  He had a MRI done on  and again saw orthopedics on  with placement of a cast on the L LE with NWB orders due to Grade 3 ATFL, calcaneofibular and deltoid ligament tears as well as low-grade partial-thickness of posterior tibialis tendon tear.  He had the cast removed on 17 with use of boot after this.  He presents today with stiffness and states pain after getting up from sitting for long periods.  Yestreday he went to his cousins house without his boot on with just tennis shoes and had increased pain after sitting for long time.  He presents today without boot on the foot.      PAST MEDICAL HISTORY:    Past Medical History:   Diagnosis Date    Asthma        CURRENT MEDICATIONS:    Current Outpatient Prescriptions:     ALBUTEROL, REFILL, INHL, Inhale into the lungs., Disp: , Rfl:     fluticasone-salmeterol 100-50 mcg/dose (ADVAIR) 100-50 mcg/dose  diskus inhaler, Inhale 1 puff into the lungs 2 (two) times daily., Disp: , Rfl:     hyoscyamine (ANASPAZ,LEVSIN) 0.125 mg Tab, Take 1 tablet (125 mcg total) by mouth every 6 (six) hours as needed (abdominal cramping)., Disp: 15 tablet, Rfl: 0    ibuprofen (ADVIL,MOTRIN) 600 MG tablet, Take 1 tablet (600 mg total) by mouth 3 (three) times daily., Disp: 40 tablet, Rfl: 1    OBJECTIVE:  Pain: 5-6 /10, located anterior/middle ankle, described as sore/achy    Sensation: intact to light touch     Ankle ROM:  Plantar Flexion  Left  20 to 30 degrees  Right 60 degrees        Dorsi Flexion   Left -5 to 5 degrees   Right 10 degrees      Inversion   Left 5 to 15 degrees  Right 20 degrees       Eversion   Left       Strength:  Anterior tibialis   Left 4   Right 4+      Posterior tibialis  Left 4   Right 4+     Gastrocnemius  Left 4   Right 4+      Soleus    Left 4   Right 4+     Peroneals   Left 4-   Right 4+       Hamstrings    Left 4-   Right 5-     Quadriceps   Left 4-   Right 4+     Iliopsoas   Left 3+   Right 4-     Gluteus Medius  Left 3+   Right 4+     Gluteus Charlie  Left 3+   Right 4      Girth:   Malleoli    Left 25.5 cm   Right 25 cm      Figure 8    Left 51 cm   Right 50.5 cm            Function:  LOWER EXTREMITY FUNCTIONAL SCALE                EVAL    1. Any of your usual work, housework or school activities   2/4  2. Your usual hobbies, sporting     0/4  3. Getting in and out of tub      2/4  4. Walking between rooms      2/4  5. Putting on shoes or socks      1/4  6. Squatting        0/4  7. Lifting an object from the ground      2/4  8. Performing light activities around the home   2/4  9. Performing heavy activities around the home   0/4  10. Getting in and out of car      2/4  11. Walking 2 blocks       2/4  12.Walking a mile       0/4  13. Getting up and down 1 flight of stairs    1/4  14. Standing for 1 hour      0/4  15. Sitting for an hour       1/4  16. Running on even ground      0/4  17. Running on  uneven ground     0/4  18. Making sharp turns when running fast    0/4  19. Hopping        1/4  20. Rolling over in bed       4/4          Total: 22/80         Patient reports 72.5% disability based on score of the Lower Extremity Functional Scale.    Tenderness to palpation:  Anterior and medial L ankle    Other: SLS L LE 25 seconds, R LE 30 seconds     ASSESSMENT:  Patient with improved performance on lunges with the L LE posterior following manual IASTM to the gastroc and into the Achilles tendon.  He had increased shaking on the hip machine today with VC needed to maintain form.  He was unable to perform a single leg heel raise on the L LE and needed to perform on the leg press.        Initial:  The patient is a 21 y.o. year old male who presents to physical therapy with complaints of L ankle pain.  Patient's impairments include decreased L LE strength in the ankle, knee and hip, decreased L ankle ROM, and L ankle swelling.  Patient also limited with balance and SLS time on the L LE compared to the R LE  These impairments are limiting patient's ability to participate in sports and recreational activities as well as increased standing and walking.  Patient's prognosis is good to meet the following goals.  Patient will benefit from skilled physical therapy intervention to address the above listed deficits and below listed goals to increase patient's functional mobility and stability.    Short Term Goals:    1.  Patient will be educated on HEP and report compliance 2 days per week.  2.  Patient will increase strength of the L LE by 1/3 MMT grade to increase functional stability.  3.  Patient will decrease LEFS disability to less than 55% to evidence increased functional mobility.  4.  Patient will have decreased swelling in the L ankle to = that of the R ankle to increase functional mobility.    Long Term Goals:  1.  Patient will increase strength of the L LE by 1/2 MMT grade to increase functional stability.  2.   "Patient will decrease LEFS disability to less than 40% to evidence increased functional mobility.  3.  Patient will increase L LE ROM to WFL to increase functional mobility.    Co-morbidities which may impact the plan of care and potentially impede the patient's progress in therapy include: NA    The patient's clinical presentation is stable.    TREATMENT PROVIDED:  Ultrasound: (deferred today)  20%, 1.2 w/cm2, 3.3 mHz to the dorsum of the L ankle at the talus.     Manual therapy: (8 minutes) IASTM and strumming to the R gastroc and at the talus to decrease tissue tension.  Overpressure with seated heel raise to increase toe extension to improve gait and lunge form.          Therapeutic exercise: (50 minutes)  Bike 5 minutes level 3  Supine HSS with strap, 3x30" B LE   Prone quad stretch, B LE, 3x30"   Gastroc stretch on slant board, 3x30" each   Hip machine, abd/flex/ext, 90#, 2x10 B LE   Toe walk, 4x30 feet  Heel walk, 4x30 feet   Squats 2x10 at parallel bars  Lunges, 20 reps B LE forward   Bridges, 20x, blue band at knees  Pelvic tilts, 20x   BAPS board, level 2 DF/PF, inv/ever, CW/CCW, 30x each  Single leg heel raises, 2x10 with B UE support, 1x5 without support  Single leg heel raises on the leg press, level 7, 2x10          Deferred today:   Single leg press, 7 bands, 3x10 each LE    Squats at chair with red band at knees, 2x10  Squat holds at chair trials, up to 12" one rep  Rebounder, 3 way with L LE, 4#, 30x each   Ankle 4-way, blue band, 2x10  Standing hip abd/ext/flex, black band, 3x10 each B LE   Alt heel/toe raises on foam, 30x  L SLS 3x30" on foam   Cat/cow in quadruped with max cues, 5 reps  Quadruped hip abd, 5x B LE  B piriformis stretch, 2x30"          PLAN:  Patient will benefit from physical therapy (2) x/week for (8) weeks including manual therapy, therapeutic exercise, functional activities, modalities, and patient education.    Thank you for this referral.    These services are reasonable and " necessary for the conditions set forth above while under my care.

## 2017-08-21 ENCOUNTER — CLINICAL SUPPORT (OUTPATIENT)
Dept: REHABILITATION | Facility: HOSPITAL | Age: 22
End: 2017-08-21
Attending: ORTHOPAEDIC SURGERY
Payer: MEDICAID

## 2017-08-21 PROCEDURE — 97110 THERAPEUTIC EXERCISES: CPT | Performed by: PHYSICAL THERAPIST

## 2017-08-21 NOTE — PROGRESS NOTES
PHYSICAL THERAPY DAILY PROGRESS NOTE     Referring Provider:  Dr. Benigno Moses    Diagnosis:       ICD-10-CM ICD-9-CM    1. Grade 3 ankle sprain, left, subsequent encounter S93.402D V58.89      845.00           Orders:  Evaluate and Treat    Date of Initial Evaluation: 17    Orders : 17    Authorization expires: 17    Visit # 14 (13 of 16 authorized)    SUBJECTIVE:  Patient reports he has been practicing his squats and lunges at home but not stretching much.      Initial:  Patient reports injury on 17 while he was playing basketball.  Another player ran in front of him while he was going to shoot the ball which caused him to fall and roll his L ankle.  He was seen in the ER at [facility] due to swelling and pain and at that time he was put in a walking boot.  He states this was too painful and then was see at [facility] with a posterior splint put on the foot.  He saw orthopedics here at [facility] on 17 for follow-up.  He had a MRI done on  and again saw orthopedics on  with placement of a cast on the L LE with NWB orders due to Grade 3 ATFL, calcaneofibular and deltoid ligament tears as well as low-grade partial-thickness of posterior tibialis tendon tear.  He had the cast removed on 17 with use of boot after this.  He presents today with stiffness and states pain after getting up from sitting for long periods.  Yestreday he went to his cousins house without his boot on with just tennis shoes and had increased pain after sitting for long time.  He presents today without boot on the foot.      PAST MEDICAL HISTORY:    Past Medical History:   Diagnosis Date    Asthma        CURRENT MEDICATIONS:    Current Outpatient Prescriptions:     ALBUTEROL, REFILL, INHL, Inhale into the lungs., Disp: , Rfl:     fluticasone-salmeterol 100-50 mcg/dose (ADVAIR) 100-50 mcg/dose diskus inhaler, Inhale 1 puff into the lungs 2 (two) times daily., Disp: , Rfl:     hyoscyamine (ANASPAZ,LEVSIN) 0.125 mg  Tab, Take 1 tablet (125 mcg total) by mouth every 6 (six) hours as needed (abdominal cramping)., Disp: 15 tablet, Rfl: 0    ibuprofen (ADVIL,MOTRIN) 600 MG tablet, Take 1 tablet (600 mg total) by mouth 3 (three) times daily., Disp: 40 tablet, Rfl: 1    OBJECTIVE:  Pain: 5-6 /10, located anterior/middle ankle, described as sore/achy    Sensation: intact to light touch     Ankle ROM:  Plantar Flexion  Left  20 to 30 degrees  Right 60 degrees        Dorsi Flexion   Left -5 to 5 degrees   Right 10 degrees      Inversion   Left 5 to 15 degrees  Right 20 degrees       Eversion   Left       Strength:  Anterior tibialis   Left 4   Right 4+      Posterior tibialis  Left 4   Right 4+     Gastrocnemius  Left 4   Right 4+      Soleus    Left 4   Right 4+     Peroneals   Left 4-   Right 4+       Hamstrings    Left 4-   Right 5-     Quadriceps   Left 4-   Right 4+     Iliopsoas   Left 3+   Right 4-     Gluteus Medius  Left 3+   Right 4+     Gluteus Charlie  Left 3+   Right 4      Girth:   Malleoli    Left 25.5 cm   Right 25 cm      Figure 8    Left 51 cm   Right 50.5 cm            Function:  LOWER EXTREMITY FUNCTIONAL SCALE                EVAL    1. Any of your usual work, housework or school activities   2/4  2. Your usual hobbies, sporting     0/4  3. Getting in and out of tub      2/4  4. Walking between rooms      2/4  5. Putting on shoes or socks      1/4  6. Squatting        0/4  7. Lifting an object from the ground      2/4  8. Performing light activities around the home   2/4  9. Performing heavy activities around the home   0/4  10. Getting in and out of car      2/4  11. Walking 2 blocks       2/4  12.Walking a mile       0/4  13. Getting up and down 1 flight of stairs    1/4  14. Standing for 1 hour      0/4  15. Sitting for an hour       1/4  16. Running on even ground      0/4  17. Running on uneven ground     0/4  18. Making sharp turns when running fast    0/4  19. Hopping        1/4  20. Rolling over in  bed       4/4          Total: 22/80         Patient reports 72.5% disability based on score of the Lower Extremity Functional Scale.    Tenderness to palpation:  Anterior and medial L ankle    Other: SLS L LE 25 seconds, R LE 30 seconds     ASSESSMENT:  Patient demonstrated improvement with squats and lunges today; however, still some IR occurring at the hip.  He was able to perform plyometric activity with minimal soreness.  He has increased soreness in the hips today following increased strengthening activities.      Initial:  The patient is a 21 y.o. year old male who presents to physical therapy with complaints of L ankle pain.  Patient's impairments include decreased L LE strength in the ankle, knee and hip, decreased L ankle ROM, and L ankle swelling.  Patient also limited with balance and SLS time on the L LE compared to the R LE  These impairments are limiting patient's ability to participate in sports and recreational activities as well as increased standing and walking.  Patient's prognosis is good to meet the following goals.  Patient will benefit from skilled physical therapy intervention to address the above listed deficits and below listed goals to increase patient's functional mobility and stability.    Short Term Goals:    1.  Patient will be educated on HEP and report compliance 2 days per week.  2.  Patient will increase strength of the L LE by 1/3 MMT grade to increase functional stability.  3.  Patient will decrease LEFS disability to less than 55% to evidence increased functional mobility.  4.  Patient will have decreased swelling in the L ankle to = that of the R ankle to increase functional mobility.    Long Term Goals:  1.  Patient will increase strength of the L LE by 1/2 MMT grade to increase functional stability.  2.  Patient will decrease LEFS disability to less than 40% to evidence increased functional mobility.  3.  Patient will increase L LE ROM to WFL to increase functional  "mobility.    Co-morbidities which may impact the plan of care and potentially impede the patient's progress in therapy include: NA    The patient's clinical presentation is stable.    TREATMENT PROVIDED:  Ultrasound: (deferred today)  20%, 1.2 w/cm2, 3.3 mHz to the dorsum of the L ankle at the talus.     Manual therapy: (deferred today) IASTM and strumming to the L gastroc and at the talus to decrease tissue tension.  Overpressure with seated heel raise to increase toe extension to improve gait and lunge form.          Therapeutic exercise: (50 minutes)  Bike 5 minutes level 4  Supine HSS with strap, 3x30" B LE   Prone quad stretch, B LE, 3x30"   Gastroc stretch on slant board, 3x30" each   Hip machine, abd/flex/ext, 90#, 2x10 B LE   Squats 2x10 at parallel bars  Lunges, 20 reps B LE forward   Bridges, 30x, black band at knees  L Single leg heel raises, 2x10 with B UE support, 1x10 without support  Rebounder, 3 way with L LE, 4#, 20x each   Leg press, 8 bands, 3 minutes, black band  Jumps on mini-tramp, 1 minute  Marching jumps on mini tramp, 30x   Jumps off trampoline, 30x with VC for knees IR        Deferred today:   Single leg heel raises on the leg press, level 7, 2x10  Pelvic tilts, 20x   BAPS board, level 2 DF/PF, inv/ever, CW/CCW, 30x each  Single leg press, 7 bands, 3x10 each LE    Squats at chair with red band at knees, 2x10  Squat holds at chair trials, up to 12" one rep  Ankle 4-way, blue band, 2x10  Standing hip abd/ext/flex, black band, 3x10 each B LE   Alt heel/toe raises on foam, 30x  L SLS 3x30" on foam   Cat/cow in quadruped with max cues, 5 reps  Quadruped hip abd, 5x B LE  B piriformis stretch, 2x30"    Toe walk, 4x30 feet  Heel walk, 4x30 feet       PLAN:  Patient will benefit from physical therapy (2) x/week for (8) weeks including manual therapy, therapeutic exercise, functional activities, modalities, and patient education.    Thank you for this referral.    These services are reasonable and " necessary for the conditions set forth above while under my care.

## 2017-08-24 ENCOUNTER — CLINICAL SUPPORT (OUTPATIENT)
Dept: REHABILITATION | Facility: HOSPITAL | Age: 22
End: 2017-08-24
Attending: ORTHOPAEDIC SURGERY
Payer: MEDICAID

## 2017-08-24 PROCEDURE — 97110 THERAPEUTIC EXERCISES: CPT | Performed by: PHYSICAL THERAPIST

## 2017-08-24 NOTE — PROGRESS NOTES
PHYSICAL THERAPY DAILY PROGRESS NOTE     Referring Provider:  Dr. Benigno Moses    Diagnosis:       ICD-10-CM ICD-9-CM    1. Grade 3 ankle sprain, left, subsequent encounter S93.402D V58.89      845.00           Orders:  Evaluate and Treat    Date of Initial Evaluation: 17    Orders : 17    Authorization expires: 17    Visit # 15 (14 of 16 authorized)    SUBJECTIVE:  Patient states he feels stiff in the mornings and then he performs heel raises and this helps however he still reports feeling tightness.  He has not been stretching as much as discussed in instruction of HEP.      Initial:  Patient reports injury on 17 while he was playing basketball.  Another player ran in front of him while he was going to shoot the ball which caused him to fall and roll his L ankle.  He was seen in the ER at [facility] due to swelling and pain and at that time he was put in a walking boot.  He states this was too painful and then was see at [facility] with a posterior splint put on the foot.  He saw orthopedics here at [facility] on 17 for follow-up.  He had a MRI done on  and again saw orthopedics on  with placement of a cast on the L LE with NWB orders due to Grade 3 ATFL, calcaneofibular and deltoid ligament tears as well as low-grade partial-thickness of posterior tibialis tendon tear.  He had the cast removed on 17 with use of boot after this.  He presents today with stiffness and states pain after getting up from sitting for long periods.  Yestreday he went to his cousins house without his boot on with just tennis shoes and had increased pain after sitting for long time.  He presents today without boot on the foot.      PAST MEDICAL HISTORY:    Past Medical History:   Diagnosis Date    Asthma        CURRENT MEDICATIONS:    Current Outpatient Prescriptions:     ALBUTEROL, REFILL, INHL, Inhale into the lungs., Disp: , Rfl:     fluticasone-salmeterol 100-50 mcg/dose (ADVAIR) 100-50 mcg/dose diskus  inhaler, Inhale 1 puff into the lungs 2 (two) times daily., Disp: , Rfl:     hyoscyamine (ANASPAZ,LEVSIN) 0.125 mg Tab, Take 1 tablet (125 mcg total) by mouth every 6 (six) hours as needed (abdominal cramping)., Disp: 15 tablet, Rfl: 0    ibuprofen (ADVIL,MOTRIN) 600 MG tablet, Take 1 tablet (600 mg total) by mouth 3 (three) times daily., Disp: 40 tablet, Rfl: 1    OBJECTIVE:  Pain: 5-6 /10, located anterior/middle ankle, described as sore/achy    Sensation: intact to light touch     Ankle ROM:  Plantar Flexion  Left  20 to 30 degrees  Right 60 degrees        Dorsi Flexion   Left -5 to 5 degrees   Right 10 degrees      Inversion   Left 5 to 15 degrees  Right 20 degrees       Eversion   Left       Strength:  Anterior tibialis   Left 4   Right 4+      Posterior tibialis  Left 4   Right 4+     Gastrocnemius  Left 4   Right 4+      Soleus    Left 4   Right 4+     Peroneals   Left 4-   Right 4+       Hamstrings    Left 4-   Right 5-     Quadriceps   Left 4-   Right 4+     Iliopsoas   Left 3+   Right 4-     Gluteus Medius  Left 3+   Right 4+     Gluteus Charlie  Left 3+   Right 4      Girth:   Malleoli    Left 25.5 cm   Right 25 cm      Figure 8    Left 51 cm   Right 50.5 cm            Function:  LOWER EXTREMITY FUNCTIONAL SCALE                EVAL    1. Any of your usual work, housework or school activities   2/4  2. Your usual hobbies, sporting     0/4  3. Getting in and out of tub      2/4  4. Walking between rooms      2/4  5. Putting on shoes or socks      1/4  6. Squatting        0/4  7. Lifting an object from the ground      2/4  8. Performing light activities around the home   2/4  9. Performing heavy activities around the home   0/4  10. Getting in and out of car      2/4  11. Walking 2 blocks       2/4  12.Walking a mile       0/4  13. Getting up and down 1 flight of stairs    1/4  14. Standing for 1 hour      0/4  15. Sitting for an hour       1/4  16. Running on even ground      0/4  17. Running on uneven  ground     0/4  18. Making sharp turns when running fast    0/4  19. Hopping        1/4  20. Rolling over in bed       4/4          Total: 22/80         Patient reports 72.5% disability based on score of the Lower Extremity Functional Scale.    Tenderness to palpation:  Anterior and medial L ankle    Other: SLS L LE 25 seconds, R LE 30 seconds     ASSESSMENT:  Patient improved form with squats and with squat jumps.  Trial of running was poor due to decreased push-off.  Increased education on stretching the gastroc, performing high knee jumps, and on plyometric jumps on his feet.        Initial:  The patient is a 21 y.o. year old male who presents to physical therapy with complaints of L ankle pain.  Patient's impairments include decreased L LE strength in the ankle, knee and hip, decreased L ankle ROM, and L ankle swelling.  Patient also limited with balance and SLS time on the L LE compared to the R LE  These impairments are limiting patient's ability to participate in sports and recreational activities as well as increased standing and walking.  Patient's prognosis is good to meet the following goals.  Patient will benefit from skilled physical therapy intervention to address the above listed deficits and below listed goals to increase patient's functional mobility and stability.    Short Term Goals:    1.  Patient will be educated on HEP and report compliance 2 days per week.  2.  Patient will increase strength of the L LE by 1/3 MMT grade to increase functional stability.  3.  Patient will decrease LEFS disability to less than 55% to evidence increased functional mobility.  4.  Patient will have decreased swelling in the L ankle to = that of the R ankle to increase functional mobility.    Long Term Goals:  1.  Patient will increase strength of the L LE by 1/2 MMT grade to increase functional stability.  2.  Patient will decrease LEFS disability to less than 40% to evidence increased functional mobility.  3.   "Patient will increase L LE ROM to WFL to increase functional mobility.    Co-morbidities which may impact the plan of care and potentially impede the patient's progress in therapy include: NA    The patient's clinical presentation is stable.    TREATMENT PROVIDED:  Ultrasound: (deferred today)  20%, 1.2 w/cm2, 3.3 mHz to the dorsum of the L ankle at the talus.     Manual therapy: (deferred today) IASTM and strumming to the L gastroc and at the talus to decrease tissue tension.  Overpressure with seated heel raise to increase toe extension to improve gait and lunge form.          Therapeutic exercise: (55 minutes)  Bike 5 minutes level 4  Supine HSS with strap, 3x30" B LE   Prone quad stretch, B LE, 3x30"   Gastroc stretch on slant board, 3x30" each   Hip machine, abd/flex/ext, 90#, 2x10 B LE   Lunges, 20 reps B LE forward   L Single leg heel raises, 2x10, 1 finger support  Rebounder, 3 way with L LE, 4#, 20x each   Jumps on mini-tramp, 1 minute  Marching jumps on mini tramp, 30x   Squat jumps on mini-tramp, 5 reps  Jumps off trampoline, 2x with VC for knees IR  Wall squats, with ball, 15x  Squats at chair with red band at knees, 2x10  Squat jumps with red band at knees, 15x  Jog in shay, 50 ft, 4 laps  High knee run on toes, 50 ft x 2      Deferred today:   Bridges, 30x, black band at knees  Squats 2x10 at parallel bars  Single leg heel raises on the leg press, level 7, 2x10  Pelvic tilts, 20x   BAPS board, level 2 DF/PF, inv/ever, CW/CCW, 30x each  Single leg press, 7 bands, 3x10 each LE    Squat holds at chair trials, up to 12" one rep  Ankle 4-way, blue band, 2x10  Standing hip abd/ext/flex, black band, 3x10 each B LE   Alt heel/toe raises on foam, 30x  L SLS 3x30" on foam   Cat/cow in quadruped with max cues, 5 reps  Quadruped hip abd, 5x B LE  B piriformis stretch, 2x30"    Toe walk, 4x30 feet  Heel walk, 4x30 feet       PLAN:  Patient will benefit from physical therapy (2) x/week for (8) weeks including manual " therapy, therapeutic exercise, functional activities, modalities, and patient education.    Thank you for this referral.    These services are reasonable and necessary for the conditions set forth above while under my care.

## 2017-08-28 ENCOUNTER — CLINICAL SUPPORT (OUTPATIENT)
Dept: REHABILITATION | Facility: HOSPITAL | Age: 22
End: 2017-08-28
Attending: ORTHOPAEDIC SURGERY
Payer: MEDICAID

## 2017-08-28 ENCOUNTER — OFFICE VISIT (OUTPATIENT)
Dept: ORTHOPEDICS | Facility: CLINIC | Age: 22
End: 2017-08-28
Payer: MEDICAID

## 2017-08-28 VITALS
BODY MASS INDEX: 21.7 KG/M2 | HEART RATE: 68 BPM | WEIGHT: 155 LBS | HEIGHT: 71 IN | SYSTOLIC BLOOD PRESSURE: 127 MMHG | DIASTOLIC BLOOD PRESSURE: 84 MMHG | RESPIRATION RATE: 12 BRPM

## 2017-08-28 PROCEDURE — 3008F BODY MASS INDEX DOCD: CPT | Mod: ,,, | Performed by: PHYSICIAN ASSISTANT

## 2017-08-28 PROCEDURE — 99212 OFFICE O/P EST SF 10 MIN: CPT | Mod: S$PBB,,, | Performed by: PHYSICIAN ASSISTANT

## 2017-08-28 PROCEDURE — 99213 OFFICE O/P EST LOW 20 MIN: CPT | Mod: PBBFAC | Performed by: PHYSICIAN ASSISTANT

## 2017-08-28 PROCEDURE — 99999 PR PBB SHADOW E&M-EST. PATIENT-LVL III: CPT | Mod: PBBFAC,,, | Performed by: PHYSICIAN ASSISTANT

## 2017-08-28 PROCEDURE — 97110 THERAPEUTIC EXERCISES: CPT | Performed by: PHYSICAL THERAPIST

## 2017-08-28 NOTE — PROGRESS NOTES
PHYSICAL THERAPY DAILY PROGRESS NOTE     Referring Provider:  Dr. Benigno Moses    Diagnosis:       ICD-10-CM ICD-9-CM    1. Grade 3 ankle sprain, left, subsequent encounter S93.402D V58.89      845.00           Orders:  Evaluate and Treat    Date of Initial Evaluation: 17    Updated orders : 17    Authorization expires: 17    Visit # 16 (15 of 16 authorized)    SUBJECTIVE:  Patient reports he saw the doctor today with reports that the running and jumping will progress with time.  He states the PA requested PT to contact them regarding more therapy.        Initial:  Patient reports injury on 17 while he was playing basketball.  Another player ran in front of him while he was going to shoot the ball which caused him to fall and roll his L ankle.  He was seen in the ER at [facility] due to swelling and pain and at that time he was put in a walking boot.  He states this was too painful and then was see at [facility] with a posterior splint put on the foot.  He saw orthopedics here at [facility] on 17 for follow-up.  He had a MRI done on  and again saw orthopedics on  with placement of a cast on the L LE with NWB orders due to Grade 3 ATFL, calcaneofibular and deltoid ligament tears as well as low-grade partial-thickness of posterior tibialis tendon tear.  He had the cast removed on 17 with use of boot after this.  He presents today with stiffness and states pain after getting up from sitting for long periods.  Yestreday he went to his cousins house without his boot on with just tennis shoes and had increased pain after sitting for long time.  He presents today without boot on the foot.      PAST MEDICAL HISTORY:    Past Medical History:   Diagnosis Date    Asthma        CURRENT MEDICATIONS:    Current Outpatient Prescriptions:     ALBUTEROL, REFILL, INHL, Inhale into the lungs., Disp: , Rfl:     fluticasone-salmeterol 100-50 mcg/dose (ADVAIR) 100-50 mcg/dose diskus inhaler, Inhale 1 puff  into the lungs 2 (two) times daily., Disp: , Rfl:     hyoscyamine (ANASPAZ,LEVSIN) 0.125 mg Tab, Take 1 tablet (125 mcg total) by mouth every 6 (six) hours as needed (abdominal cramping)., Disp: 15 tablet, Rfl: 0    ibuprofen (ADVIL,MOTRIN) 600 MG tablet, Take 1 tablet (600 mg total) by mouth 3 (three) times daily., Disp: 40 tablet, Rfl: 1    OBJECTIVE:  Pain: 5-6 /10, located anterior/middle ankle, described as sore/achy    Sensation: intact to light touch     Ankle ROM:  Plantar Flexion  Left  20 to 30 degrees  Right 60 degrees        Dorsi Flexion   Left -5 to 5 degrees   Right 10 degrees      Inversion   Left 5 to 15 degrees  Right 20 degrees       Eversion   Left       Strength:  Anterior tibialis   Left 4   Right 4+      Posterior tibialis  Left 4   Right 4+     Gastrocnemius  Left 4   Right 4+      Soleus    Left 4   Right 4+     Peroneals   Left 4-   Right 4+       Hamstrings    Left 4-   Right 5-     Quadriceps   Left 4-   Right 4+     Iliopsoas   Left 3+   Right 4-     Gluteus Medius  Left 3+   Right 4+     Gluteus Charlie  Left 3+   Right 4      Girth:   Malleoli    Left 25.5 cm   Right 25 cm      Figure 8    Left 51 cm   Right 50.5 cm            Function:  LOWER EXTREMITY FUNCTIONAL SCALE                EVAL    1. Any of your usual work, housework or school activities   2/4  2. Your usual hobbies, sporting     0/4  3. Getting in and out of tub      2/4  4. Walking between rooms      2/4  5. Putting on shoes or socks      1/4  6. Squatting        0/4  7. Lifting an object from the ground      2/4  8. Performing light activities around the home   2/4  9. Performing heavy activities around the home   0/4  10. Getting in and out of car      2/4  11. Walking 2 blocks       2/4  12.Walking a mile       0/4  13. Getting up and down 1 flight of stairs    1/4  14. Standing for 1 hour      0/4  15. Sitting for an hour       1/4  16. Running on even ground      0/4  17. Running on uneven ground     0/4  18.  Making sharp turns when running fast    0/4  19. Hopping        1/4  20. Rolling over in bed       4/4          Total: 22/80         Patient reports 72.5% disability based on score of the Lower Extremity Functional Scale.    Tenderness to palpation:  Anterior and medial L ankle    Other: SLS L LE 25 seconds, R LE 30 seconds     ASSESSMENT:   Patient had fatigue with marching/balancing activity on the mini-tramp today.  He was able to perform squats with less difficulty and only required VC for lunges with the L foot posterior to increase metatarsal extension.      Initial:  The patient is a 21 y.o. year old male who presents to physical therapy with complaints of L ankle pain.  Patient's impairments include decreased L LE strength in the ankle, knee and hip, decreased L ankle ROM, and L ankle swelling.  Patient also limited with balance and SLS time on the L LE compared to the R LE  These impairments are limiting patient's ability to participate in sports and recreational activities as well as increased standing and walking.  Patient's prognosis is good to meet the following goals.  Patient will benefit from skilled physical therapy intervention to address the above listed deficits and below listed goals to increase patient's functional mobility and stability.    Short Term Goals:    1.  Patient will be educated on HEP and report compliance 2 days per week.  2.  Patient will increase strength of the L LE by 1/3 MMT grade to increase functional stability.  3.  Patient will decrease LEFS disability to less than 55% to evidence increased functional mobility.  4.  Patient will have decreased swelling in the L ankle to = that of the R ankle to increase functional mobility.    Long Term Goals:  1.  Patient will increase strength of the L LE by 1/2 MMT grade to increase functional stability.  2.  Patient will decrease LEFS disability to less than 40% to evidence increased functional mobility.  3.  Patient will increase L  "LE ROM to WFL to increase functional mobility.    Co-morbidities which may impact the plan of care and potentially impede the patient's progress in therapy include: NA    The patient's clinical presentation is stable.    TREATMENT PROVIDED:  Ultrasound: (deferred today)  20%, 1.2 w/cm2, 3.3 mHz to the dorsum of the L ankle at the talus.     Manual therapy: (deferred today) IASTM and strumming to the L gastroc and at the talus to decrease tissue tension.  Overpressure with seated heel raise to increase toe extension to improve gait and lunge form.          Therapeutic exercise: (55 minutes)  Bike 5 minutes level 4  Supine HSS with strap, 3x30" B LE   Prone quad stretch, B LE, 3x30"   Gastroc stretch on slant board, 3x30" each   Hip machine, abd/flex/ext, 90#, 2x10 B LE   Lunges, 20 reps B LE forward   L Single leg heel raises, 2x20, 1 finger support  Rebounder, 3 way with L LE on foam, 4#, 20x each   Squats at chair with red band at knees, 2x10  Quick side step on balls of feet, 2x each length of the gym  Alt march on mini-tramp x 3 with opp SLS, 3 x 1:00  Plyometric jumps, 2x10              Deferred today:   Jumps on mini-tramp, 1 minute  Marching jumps on mini tramp, 30x   Squat jumps on mini-tramp, 5 reps  Jumps off trampoline, 2x with VC for knees IR  Wall squats, with ball, 15x  Squat jumps with red band at knees, 15x  Jog in shay, 50 ft, 4 laps  High knee run on toes, 50 ft x 2  Bridges, 30x, black band at knees  Squats 2x10 at parallel bars  Single leg heel raises on the leg press, level 7, 2x10  Pelvic tilts, 20x   BAPS board, level 2 DF/PF, inv/ever, CW/CCW, 30x each  Single leg press, 7 bands, 3x10 each LE    Squat holds at chair trials, up to 12" one rep  Ankle 4-way, blue band, 2x10  Standing hip abd/ext/flex, black band, 3x10 each B LE   Quadruped hip abd, 5x B LE  B piriformis stretch, 2x30"    Toe walk, 4x30 feet  Heel walk, 4x30 feet       PLAN:  Patient will benefit from physical therapy (2) x/week " for (8) weeks including manual therapy, therapeutic exercise, functional activities, modalities, and patient education.    Thank you for this referral.    These services are reasonable and necessary for the conditions set forth above while under my care.

## 2017-08-28 NOTE — PROGRESS NOTES
Subjective:      Patient ID: Kayy Navas is a 21 y.o. male.    Chief Complaint: Follow-up of the Left Ankle    HPI  Patient was asked to clinic for follow-up regarding his left ankle.  He has been in outpatient physical therapy and is doing very well.  He does not complain of any pain currently.  The patient's only concern is that he has difficulty with running and jumping.         Review of Systems   Constitution: Negative for chills, fever and night sweats.   Respiratory: Negative for cough, shortness of breath and wheezing.    Musculoskeletal: Negative for joint pain and joint swelling.   Gastrointestinal: Negative for diarrhea, nausea and vomiting.   Neurological: Negative for brief paralysis.   Psychiatric/Behavioral: Negative for altered mental status.         Objective:            General    Constitutional: He is oriented to person, place, and time. He appears well-developed and well-nourished.   Neck: Normal range of motion.   Cardiovascular: Normal rate and regular rhythm.    Pulmonary/Chest: Effort normal.   Abdominal: Soft.   Neurological: He is alert and oriented to person, place, and time.   Psychiatric: He has a normal mood and affect. His behavior is normal.     General Musculoskeletal Exam   Gait: normal     Left Ankle/Foot Exam     Inspection  Bruising: Ankle - absent Foot - absent  Effusion: Ankle - absent Foot - absent    Range of Motion   The patient has normal left ankle ROM.     Alignment   Hindfoot Alignment: neutral  Midfoot Alignment: normal  Forefoot Alignment: normal    Muscle Strength   The patient has normal left ankle strength.    Tests   Anterior drawer: negative  Single Heel Rise: able to perform  External Rotation Test: negative  Squeeze Test: absent    Other   Ankle Crepitus: absent  Peroneal Subluxation: negative    Comments:  No pain or TTP. No ligamentous laxity.                       Assessment:       Encounter Diagnosis   Name Primary?    Grade 3 ankle sprain, left,  subsequent encounter Yes          Plan:       Kayy was seen today for follow-up.    Diagnoses and all orders for this visit:    Grade 3 ankle sprain, left, subsequent encounter      The patient is doing very well following outpatient rehab for his left ankle. He desires to have additional physical therapy since he feels uncomfortable running and jumping. He will benefit from an additional 4 weeks of PT if necessary.     He was instructed to follow PT protocol of stretching exercises. He admits that he has not been very compliant doing these exercises every day.     If the patient feels that he should be seen for any reason regarding his ankle, he will make an appointment. Otherwise, I am pleased with his ankle recovery.             Luz Elena Nathan PA-C

## 2017-08-29 DIAGNOSIS — S99.912S ANKLE INJURY, LEFT, SEQUELA: Primary | ICD-10-CM

## 2017-08-31 ENCOUNTER — CLINICAL SUPPORT (OUTPATIENT)
Dept: REHABILITATION | Facility: HOSPITAL | Age: 22
End: 2017-08-31
Attending: ORTHOPAEDIC SURGERY
Payer: MEDICAID

## 2017-08-31 PROCEDURE — 97110 THERAPEUTIC EXERCISES: CPT | Performed by: PHYSICAL THERAPIST

## 2017-08-31 NOTE — PROGRESS NOTES
PHYSICAL THERAPY DAILY PROGRESS NOTE     Referring Provider:  Dr. Benigno Moses    Diagnosis:       ICD-10-CM ICD-9-CM    1. Grade 3 ankle sprain, left, subsequent encounter S93.402D V58.89      845.00           Orders:  Evaluate and Treat    Date of Initial Evaluation: 17    Updated orders : 17    Authorization expires: 17    Visit # 17 (16 of 16 authorized)    SUBJECTIVE:  Patient states he has been stretching at home.  He is not having a lot of pain but he is unable to push-off as much on the L foot compared to the R.      Initial:  Patient reports injury on 17 while he was playing basketball.  Another player ran in front of him while he was going to shoot the ball which caused him to fall and roll his L ankle.  He was seen in the ER at [facility] due to swelling and pain and at that time he was put in a walking boot.  He states this was too painful and then was see at [facility] with a posterior splint put on the foot.  He saw orthopedics here at [facility] on 17 for follow-up.  He had a MRI done on  and again saw orthopedics on  with placement of a cast on the L LE with NWB orders due to Grade 3 ATFL, calcaneofibular and deltoid ligament tears as well as low-grade partial-thickness of posterior tibialis tendon tear.  He had the cast removed on 17 with use of boot after this.  He presents today with stiffness and states pain after getting up from sitting for long periods.  Yestreday he went to his cousins house without his boot on with just tennis shoes and had increased pain after sitting for long time.  He presents today without boot on the foot.      PAST MEDICAL HISTORY:    Past Medical History:   Diagnosis Date    Asthma        CURRENT MEDICATIONS:    Current Outpatient Prescriptions:     ALBUTEROL, REFILL, INHL, Inhale into the lungs., Disp: , Rfl:     fluticasone-salmeterol 100-50 mcg/dose (ADVAIR) 100-50 mcg/dose diskus inhaler, Inhale 1 puff into the lungs 2 (two) times  daily., Disp: , Rfl:     hyoscyamine (ANASPAZ,LEVSIN) 0.125 mg Tab, Take 1 tablet (125 mcg total) by mouth every 6 (six) hours as needed (abdominal cramping)., Disp: 15 tablet, Rfl: 0    ibuprofen (ADVIL,MOTRIN) 600 MG tablet, Take 1 tablet (600 mg total) by mouth 3 (three) times daily., Disp: 40 tablet, Rfl: 1    OBJECTIVE:  Pain: 5-6 /10, located anterior/middle ankle, described as sore/achy    Sensation: intact to light touch     Ankle ROM:  Plantar Flexion  Left  50 degrees   Right 60 degrees        Dorsi Flexion   Left 0 to 5 degrees   Right 10 degrees      Inversion   Left 20 degrees  Right 20 degrees       Eversion   Left 15 degrees  Right WFL      Strength:  Anterior tibialis   Left 4   Right 4+      Posterior tibialis  Left 4   Right 4+     Gastrocnemius  Left 4+   Right 4+      Soleus    Left 4+   Right 4+     Peroneals   Left 4+   Right 4+       Hamstrings    Left 4   Right 5-     Quadriceps   Left 4+   Right 4+     Iliopsoas   Left 4   Right 4     Gluteus Medius  Left 4+   Right 4+     Gluteus Charlie  Left 4-   Right 4      Girth:   Malleoli    Left 25.5 cm   Right 25 cm      Figure 8    Left 51 cm   Right 50.5 cm            Function:  LOWER EXTREMITY FUNCTIONAL SCALE                EVAL  8/31/17   1. Any of your usual work, housework or school activities   2/4 4/4  2. Your usual hobbies, sporting     0/4 2/4  3. Getting in and out of tub      2/4 4/4  4. Walking between rooms      2/4 4/4  5. Putting on shoes or socks      1/4 4/4  6. Squatting        0/4 1/4  7. Lifting an object from the ground      2/4 4/4  8. Performing light activities around the home   2/4  3/4  9. Performing heavy activities around the home   0/4  3/4  10. Getting in and out of car      2/4 4/4  11. Walking 2 blocks       2/4 4/4  12.Walking a mile       0/4  3/4  13. Getting up and down 1 flight of stairs    1/4 4/4  14. Standing for 1 hour      0/4 1/4  15. Sitting for an hour       1/4  3/4  16. Running on even  ground      0/4 1/4  17. Running on uneven ground     0/4 0/4  18. Making sharp turns when running fast    0/4 0/4  19. Hopping        1/4 1/4  20. Rolling over in bed       4/4 4/4          Total: 22/80 54/80         Patient reports 32.5% disability based on score of the Lower Extremity Functional Scale. (initial eval 72.5%)    Tenderness to palpation:  Anterior and medial L ankle    Other: SLS L LE 25 seconds, R LE 30 seconds     ASSESSMENT:   Patient continues with slight weakness in the L ankle compared to that R.  He also has weakness still present in the glut muscles on B hips.  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        Initial:  The patient is a 21 y.o. year old male who presents to physical therapy with complaints of L ankle pain.  Patient's impairments include decreased L LE strength in the ankle, knee and hip, decreased L ankle ROM, and L ankle swelling.  Patient also limited with balance and SLS time on the L LE compared to the R LE  These impairments are limiting patient's ability to participate in sports and recreational activities as well as increased standing and walking.  Patient's prognosis is good to meet the following goals.  Patient will benefit from skilled physical therapy intervention to address the above listed deficits and below listed goals to increase patient's functional mobility and stability.    Short Term Goals:    1.  Patient will be educated on HEP and report compliance 2 days per week. Met  2.  Patient will increase strength of the L LE by 1/3 MMT grade to increase functional stability. Met  3.  Patient will decrease LEFS disability to less than 55% to evidence increased functional mobility. Met   4.  Patient will have decreased swelling in the L ankle to = that of the R ankle to increase  "functional mobility. Met     Long Term Goals:  1.  Patient will increase strength of the L LE by 1/2 MMT grade to increase functional stability. Partially met   2.  Patient will decrease LEFS disability to less than 40% to evidence increased functional mobility. Met   3.  Patient will increase L LE ROM to WFL to increase functional mobility. Progressing.  4.  Patient will demonstrate push-off on the L LE equal to that of the R LE for plyometric activity.      Co-morbidities which may impact the plan of care and potentially impede the patient's progress in therapy include: NA    The patient's clinical presentation is stable.    TREATMENT PROVIDED:  Ultrasound: (deferred today)  20%, 1.2 w/cm2, 3.3 mHz to the dorsum of the L ankle at the talus.     Manual therapy: (deferred today) IASTM and strumming to the L gastroc and at the talus to decrease tissue tension.  Overpressure with seated heel raise to increase toe extension to improve gait and lunge form.          Therapeutic exercise: (55 minutes)  Bike 5 minutes level 4  Supine HSS with strap, 3x30" B LE   Prone quad stretch, B LE, 3x30"   Gastroc stretch on slant board, 3x30" each   L Single leg heel raises, 2x20, 1 finger support  Rebounder, 3 way with L LE on foam, 4#, 20x each   Squats at chair with red band at knees, 2x10  Alt march on mini-tramp x 3 with opp SLS, 3 x 1:00  Ladder drills, 10 minutes  Hops on both feet, 30 times  L LE hops 10 reps   ROM and MMT re-assessed         Deferred today:   Hip machine, abd/flex/ext, 90#, 2x10 B LE   Lunges, 20 reps B LE forward   Quick side step on balls of feet, 2x each length of the gym  Plyometric jumps, 2x10  Jumps on mini-tramp, 1 minute  Marching jumps on mini tramp, 30x   Squat jumps on mini-tramp, 5 reps  Jumps off trampoline, 2x with VC for knees IR  Wall squats, with ball, 15x  Squat jumps with red band at knees, 15x  Jog in shay, 50 ft, 4 laps  High knee run on toes, 50 ft x 2  Bridges, 30x, black band at " "knees  Squats 2x10 at parallel bars  Single leg heel raises on the leg press, level 7, 2x10  Pelvic tilts, 20x   BAPS board, level 2 DF/PF, inv/ever, CW/CCW, 30x each  Single leg press, 7 bands, 3x10 each LE    Squat holds at chair trials, up to 12" one rep  Ankle 4-way, blue band, 2x10  Standing hip abd/ext/flex, black band, 3x10 each B LE   Quadruped hip abd, 5x B LE  B piriformis stretch, 2x30"    Toe walk, 4x30 feet  Heel walk, 4x30 feet       PLAN:  Patient will benefit from physical therapy (2) x/week for (8) weeks including manual therapy, therapeutic exercise, functional activities, modalities, and patient education.    Thank you for this referral.    These services are reasonable and necessary for the conditions set forth above while under my care.    "

## 2017-08-31 NOTE — PLAN OF CARE
PHYSICAL THERAPY DAILY PROGRESS NOTE     Referring Provider:  Dr. Benigno Moses    Diagnosis:       ICD-10-CM ICD-9-CM    1. Grade 3 ankle sprain, left, subsequent encounter S93.402D V58.89      845.00           Orders:  Evaluate and Treat    Date of Initial Evaluation: 17    Updated orders : 17    Authorization expires: 17    Visit # 17 (16 of 16 authorized)    SUBJECTIVE:  Patient states he has been stretching at home.  He is not having a lot of pain but he is unable to push-off as much on the L foot compared to the R.      Initial:  Patient reports injury on 17 while he was playing basketball.  Another player ran in front of him while he was going to shoot the ball which caused him to fall and roll his L ankle.  He was seen in the ER at [facility] due to swelling and pain and at that time he was put in a walking boot.  He states this was too painful and then was see at [facility] with a posterior splint put on the foot.  He saw orthopedics here at [facility] on 17 for follow-up.  He had a MRI done on  and again saw orthopedics on  with placement of a cast on the L LE with NWB orders due to Grade 3 ATFL, calcaneofibular and deltoid ligament tears as well as low-grade partial-thickness of posterior tibialis tendon tear.  He had the cast removed on 17 with use of boot after this.  He presents today with stiffness and states pain after getting up from sitting for long periods.  Yestreday he went to his cousins house without his boot on with just tennis shoes and had increased pain after sitting for long time.  He presents today without boot on the foot.      PAST MEDICAL HISTORY:    Past Medical History:   Diagnosis Date    Asthma        CURRENT MEDICATIONS:    Current Outpatient Prescriptions:     ALBUTEROL, REFILL, INHL, Inhale into the lungs., Disp: , Rfl:     fluticasone-salmeterol 100-50 mcg/dose (ADVAIR) 100-50 mcg/dose diskus inhaler, Inhale 1 puff into the lungs 2 (two) times  daily., Disp: , Rfl:     hyoscyamine (ANASPAZ,LEVSIN) 0.125 mg Tab, Take 1 tablet (125 mcg total) by mouth every 6 (six) hours as needed (abdominal cramping)., Disp: 15 tablet, Rfl: 0    ibuprofen (ADVIL,MOTRIN) 600 MG tablet, Take 1 tablet (600 mg total) by mouth 3 (three) times daily., Disp: 40 tablet, Rfl: 1    OBJECTIVE:  Pain: 5-6 /10, located anterior/middle ankle, described as sore/achy    Sensation: intact to light touch     Ankle ROM:  Plantar Flexion  Left  50 degrees   Right 60 degrees        Dorsi Flexion   Left 0 to 5 degrees   Right 10 degrees      Inversion   Left 20 degrees  Right 20 degrees       Eversion   Left 15 degrees  Right WFL      Strength:  Anterior tibialis   Left 4   Right 4+      Posterior tibialis  Left 4   Right 4+     Gastrocnemius  Left 4+   Right 4+      Soleus    Left 4+   Right 4+     Peroneals   Left 4+   Right 4+       Hamstrings    Left 4   Right 5-     Quadriceps   Left 4+   Right 4+     Iliopsoas   Left 4   Right 4     Gluteus Medius  Left 4+   Right 4+     Gluteus Charlie  Left 4-   Right 4      Girth:   Malleoli    Left 25.5 cm   Right 25 cm      Figure 8    Left 51 cm   Right 50.5 cm            Function:  LOWER EXTREMITY FUNCTIONAL SCALE                EVAL  8/31/17   1. Any of your usual work, housework or school activities   2/4 4/4  2. Your usual hobbies, sporting     0/4 2/4  3. Getting in and out of tub      2/4 4/4  4. Walking between rooms      2/4 4/4  5. Putting on shoes or socks      1/4 4/4  6. Squatting        0/4 1/4  7. Lifting an object from the ground      2/4 4/4  8. Performing light activities around the home   2/4  3/4  9. Performing heavy activities around the home   0/4  3/4  10. Getting in and out of car      2/4 4/4  11. Walking 2 blocks       2/4 4/4  12.Walking a mile       0/4  3/4  13. Getting up and down 1 flight of stairs    1/4 4/4  14. Standing for 1 hour      0/4 1/4  15. Sitting for an hour       1/4  3/4  16. Running on even  ground      0/4 1/4  17. Running on uneven ground     0/4 0/4  18. Making sharp turns when running fast    0/4 0/4  19. Hopping        1/4 1/4  20. Rolling over in bed       4/4 4/4          Total: 22/80 54/80         Patient reports 32.5% disability based on score of the Lower Extremity Functional Scale. (initial eval 72.5%)    Tenderness to palpation:  Anterior and medial L ankle    Other: SLS L LE 25 seconds, R LE 30 seconds     ASSESSMENT:   Patient continues with slight weakness in the L ankle compared to that R.  He also has weakness still present in the glut muscles on B hips.  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        Initial:  The patient is a 21 y.o. year old male who presents to physical therapy with complaints of L ankle pain.  Patient's impairments include decreased L LE strength in the ankle, knee and hip, decreased L ankle ROM, and L ankle swelling.  Patient also limited with balance and SLS time on the L LE compared to the R LE  These impairments are limiting patient's ability to participate in sports and recreational activities as well as increased standing and walking.  Patient's prognosis is good to meet the following goals.  Patient will benefit from skilled physical therapy intervention to address the above listed deficits and below listed goals to increase patient's functional mobility and stability.    Short Term Goals:    1.  Patient will be educated on HEP and report compliance 2 days per week. Met  2.  Patient will increase strength of the L LE by 1/3 MMT grade to increase functional stability. Met  3.  Patient will decrease LEFS disability to less than 55% to evidence increased functional mobility. Met   4.  Patient will have decreased swelling in the L ankle to = that of the R ankle to increase  "functional mobility. Met     Long Term Goals:  1.  Patient will increase strength of the L LE by 1/2 MMT grade to increase functional stability. Partially met   2.  Patient will decrease LEFS disability to less than 40% to evidence increased functional mobility. Met   3.  Patient will increase L LE ROM to WFL to increase functional mobility. Progressing.  4.  Patient will demonstrate push-off on the L LE equal to that of the R LE for plyometric activity.      Co-morbidities which may impact the plan of care and potentially impede the patient's progress in therapy include: NA    The patient's clinical presentation is stable.    TREATMENT PROVIDED:  Ultrasound: (deferred today)  20%, 1.2 w/cm2, 3.3 mHz to the dorsum of the L ankle at the talus.     Manual therapy: (deferred today) IASTM and strumming to the L gastroc and at the talus to decrease tissue tension.  Overpressure with seated heel raise to increase toe extension to improve gait and lunge form.          Therapeutic exercise: (55 minutes)  Bike 5 minutes level 4  Supine HSS with strap, 3x30" B LE   Prone quad stretch, B LE, 3x30"   Gastroc stretch on slant board, 3x30" each   L Single leg heel raises, 2x20, 1 finger support  Rebounder, 3 way with L LE on foam, 4#, 20x each   Squats at chair with red band at knees, 2x10  Alt march on mini-tramp x 3 with opp SLS, 3 x 1:00  Ladder drills, 10 minutes  Hops on both feet, 30 times  L LE hops 10 reps   ROM and MMT re-assessed         Deferred today:   Hip machine, abd/flex/ext, 90#, 2x10 B LE   Lunges, 20 reps B LE forward   Quick side step on balls of feet, 2x each length of the gym  Plyometric jumps, 2x10  Jumps on mini-tramp, 1 minute  Marching jumps on mini tramp, 30x   Squat jumps on mini-tramp, 5 reps  Jumps off trampoline, 2x with VC for knees IR  Wall squats, with ball, 15x  Squat jumps with red band at knees, 15x  Jog in shay, 50 ft, 4 laps  High knee run on toes, 50 ft x 2  Bridges, 30x, black band at " "knees  Squats 2x10 at parallel bars  Single leg heel raises on the leg press, level 7, 2x10  Pelvic tilts, 20x   BAPS board, level 2 DF/PF, inv/ever, CW/CCW, 30x each  Single leg press, 7 bands, 3x10 each LE    Squat holds at chair trials, up to 12" one rep  Ankle 4-way, blue band, 2x10  Standing hip abd/ext/flex, black band, 3x10 each B LE   Quadruped hip abd, 5x B LE  B piriformis stretch, 2x30"    Toe walk, 4x30 feet  Heel walk, 4x30 feet       PLAN:  Patient will benefit from physical therapy (2) x/week for (8) weeks including manual therapy, therapeutic exercise, functional activities, modalities, and patient education.    Thank you for this referral.    These services are reasonable and necessary for the conditions set forth above while under my care.    "

## 2017-09-07 ENCOUNTER — CLINICAL SUPPORT (OUTPATIENT)
Dept: REHABILITATION | Facility: HOSPITAL | Age: 22
End: 2017-09-07
Attending: ORTHOPAEDIC SURGERY
Payer: MEDICAID

## 2017-09-07 PROCEDURE — 97110 THERAPEUTIC EXERCISES: CPT | Performed by: PHYSICAL THERAPIST
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
## 2017-09-15 ENCOUNTER — CLINICAL SUPPORT (OUTPATIENT)
Dept: REHABILITATION | Facility: HOSPITAL | Age: 22
End: 2017-09-15
Attending: ORTHOPAEDIC SURGERY
Payer: MEDICAID

## 2017-09-15 PROCEDURE — 97110 THERAPEUTIC EXERCISES: CPT | Performed by: PHYSICAL THERAPIST

## 2017-09-15 NOTE — PROGRESS NOTES
PHYSICAL THERAPY DAILY PROGRESS NOTE     Referring Provider:  Dr. Benigno Moses    Diagnosis:       ICD-10-CM ICD-9-CM    1. Grade 3 ankle sprain, left, subsequent encounter S93.402D V58.89      845.00           Orders:  Evaluate and Treat    Date of Initial Evaluation: 17    Updated orders : 17    Authorization expires: 10/27/17    Visit # 19 (2 of 8 authorized)    SUBJECTIVE:  Patient states he has been feeling good with no pain in the ankle.  He arrived at 3:40 for his 2:30 appt today stating they got the times confused.        Initial:  Patient reports injury on 17 while he was playing basketball.  Another player ran in front of him while he was going to shoot the ball which caused him to fall and roll his L ankle.  He was seen in the ER at [facility] due to swelling and pain and at that time he was put in a walking boot.  He states this was too painful and then was see at [facility] with a posterior splint put on the foot.  He saw orthopedics here at [facility] on 17 for follow-up.  He had a MRI done on  and again saw orthopedics on  with placement of a cast on the L LE with NWB orders due to Grade 3 ATFL, calcaneofibular and deltoid ligament tears as well as low-grade partial-thickness of posterior tibialis tendon tear.  He had the cast removed on 17 with use of boot after this.  He presents today with stiffness and states pain after getting up from sitting for long periods.  Yestreday he went to his cousins house without his boot on with just tennis shoes and had increased pain after sitting for long time.  He presents today without boot on the foot.      PAST MEDICAL HISTORY:    Past Medical History:   Diagnosis Date    Asthma        CURRENT MEDICATIONS:    Current Outpatient Prescriptions:     ALBUTEROL, REFILL, INHL, Inhale into the lungs., Disp: , Rfl:     fluticasone-salmeterol 100-50 mcg/dose (ADVAIR) 100-50 mcg/dose diskus inhaler, Inhale 1 puff into the lungs 2 (two) times  daily., Disp: , Rfl:     hyoscyamine (ANASPAZ,LEVSIN) 0.125 mg Tab, Take 1 tablet (125 mcg total) by mouth every 6 (six) hours as needed (abdominal cramping)., Disp: 15 tablet, Rfl: 0    ibuprofen (ADVIL,MOTRIN) 600 MG tablet, Take 1 tablet (600 mg total) by mouth 3 (three) times daily., Disp: 40 tablet, Rfl: 1    OBJECTIVE:  Pain: 5-6 /10, located anterior/middle ankle, described as sore/achy    Sensation: intact to light touch     Ankle ROM:  Plantar Flexion  Left  50 degrees   Right 60 degrees        Dorsi Flexion   Left 0 to 5 degrees   Right 10 degrees      Inversion   Left 20 degrees  Right 20 degrees       Eversion   Left 15 degrees  Right WFL      Strength:  Anterior tibialis   Left 4   Right 4+      Posterior tibialis  Left 4   Right 4+     Gastrocnemius  Left 4+   Right 4+      Soleus    Left 4+   Right 4+     Peroneals   Left 4+   Right 4+       Hamstrings    Left 4   Right 5-     Quadriceps   Left 4+   Right 4+     Iliopsoas   Left 4   Right 4     Gluteus Medius  Left 4+   Right 4+     Gluteus Charlie  Left 4-   Right 4      Girth:   Malleoli    Left 25.5 cm   Right 25 cm      Figure 8    Left 51 cm   Right 50.5 cm            Function:  LOWER EXTREMITY FUNCTIONAL SCALE                EVAL  8/31/17   1. Any of your usual work, housework or school activities   2/4 4/4  2. Your usual hobbies, sporting     0/4 2/4  3. Getting in and out of tub      2/4 4/4  4. Walking between rooms      2/4 4/4  5. Putting on shoes or socks      1/4 4/4  6. Squatting        0/4 1/4  7. Lifting an object from the ground      2/4 4/4  8. Performing light activities around the home   2/4  3/4  9. Performing heavy activities around the home   0/4  3/4  10. Getting in and out of car      2/4 4/4  11. Walking 2 blocks       2/4 4/4  12.Walking a mile       0/4  3/4  13. Getting up and down 1 flight of stairs    1/4 4/4  14. Standing for 1 hour      0/4 1/4  15. Sitting for an hour       1/4  3/4  16. Running on even  ground      0/4 1/4  17. Running on uneven ground     0/4 0/4  18. Making sharp turns when running fast    0/4 0/4  19. Hopping        1/4 1/4  20. Rolling over in bed       4/4 4/4          Total: 22/80 54/80         Patient reports 32.5% disability based on score of the Lower Extremity Functional Scale. (initial eval 72.5%)    Tenderness to palpation:  Anterior and medial L ankle    Other: SLS L LE 25 seconds, R LE 30 seconds     ASSESSMENT:  Patient continues to have difficulty with skipping and jumping activities on the L LE.  He is unable to get extra force to lift high off the ground.      Initial:  The patient is a 21 y.o. year old male who presents to physical therapy with complaints of L ankle pain.  Patient's impairments include decreased L LE strength in the ankle, knee and hip, decreased L ankle ROM, and L ankle swelling.  Patient also limited with balance and SLS time on the L LE compared to the R LE  These impairments are limiting patient's ability to participate in sports and recreational activities as well as increased standing and walking.  Patient's prognosis is good to meet the following goals.  Patient will benefit from skilled physical therapy intervention to address the above listed deficits and below listed goals to increase patient's functional mobility and stability.    Short Term Goals:    1.  Patient will be educated on HEP and report compliance 2 days per week. Met  2.  Patient will increase strength of the L LE by 1/3 MMT grade to increase functional stability. Met  3.  Patient will decrease LEFS disability to less than 55% to evidence increased functional mobility. Met   4.  Patient will have decreased swelling in the L ankle to = that of the R ankle to increase functional mobility. Met     Long Term Goals:  1.  Patient will increase strength of the L LE by 1/2 MMT grade to increase functional stability. Partially met   2.  Patient will decrease LEFS disability to less than 40% to  "evidence increased functional mobility. Met   3.  Patient will increase L LE ROM to WFL to increase functional mobility. Progressing.  4.  Patient will demonstrate push-off on the L LE equal to that of the R LE for plyometric activity.      Co-morbidities which may impact the plan of care and potentially impede the patient's progress in therapy include: NA    The patient's clinical presentation is stable.    TREATMENT PROVIDED:  Ultrasound: (deferred today)  20%, 1.2 w/cm2, 3.3 mHz to the dorsum of the L ankle at the talus.     Manual therapy: (deferred today) IASTM and strumming to the L gastroc and at the talus to decrease tissue tension.  Overpressure with seated heel raise to increase toe extension to improve gait and lunge form.          Therapeutic exercise: (45 minutes)  Bike 5 minutes level 4  Gastroc stretch on slant board, 3x30" each   L Single leg heel raises, 2x20, 1 finger support  Rebounder, 3 way with L LE on foam, 4#, 20x each   Squats at chair with red band at knees, 2x10  Alt march on mini-tramp x 3 with opp SLS, 3 x 1:00  Ladder drills, 8 minutes  Hip machine, abd/flex/ext, 90#, 2x10 B LE   L LE hops 10 reps  High knee quick gait, 50 ft x 2  Backwards run, 50' x 2   Slow skip form, 50' x 2       Deferred today:   Supine HSS with strap, 3x30" B LE   Prone quad stretch, B LE, 3x30"   Hops on both feet, 30 times  Lunges, 20 reps B LE forward   Quick side step on balls of feet, 2x each length of the gym  Plyometric jumps, 2x10  Jumps on mini-tramp, 1 minute  Marching jumps on mini tramp, 30x   Squat jumps on mini-tramp, 5 reps  Jumps off trampoline, 2x with VC for knees IR  Wall squats, with ball, 15x  Squat jumps with red band at knees, 15x  Jog in shay, 50 ft, 4 laps  Bridges, 30x, black band at knees  Squats 2x10 at parallel bars  Single leg heel raises on the leg press, level 7, 2x10  Pelvic tilts, 20x   BAPS board, level 2 DF/PF, inv/ever, CW/CCW, 30x each  Single leg press, 7 bands, 3x10 each LE " "   Squat holds at chair trials, up to 12" one rep  Ankle 4-way, blue band, 2x10  Standing hip abd/ext/flex, black band, 3x10 each B LE   Quadruped hip abd, 5x B LE  B piriformis stretch, 2x30"    Toe walk, 4x30 feet  Heel walk, 4x30 feet       PLAN:  Patient will benefit from physical therapy (2) x/week for (8) weeks including manual therapy, therapeutic exercise, functional activities, modalities, and patient education.    Thank you for this referral.    These services are reasonable and necessary for the conditions set forth above while under my care.    "

## 2017-09-18 ENCOUNTER — CLINICAL SUPPORT (OUTPATIENT)
Dept: REHABILITATION | Facility: HOSPITAL | Age: 22
End: 2017-09-18
Attending: ORTHOPAEDIC SURGERY
Payer: MEDICAID

## 2017-09-18 PROCEDURE — 97110 THERAPEUTIC EXERCISES: CPT | Performed by: PHYSICAL THERAPIST

## 2017-09-19 NOTE — PROGRESS NOTES
PHYSICAL THERAPY DAILY PROGRESS NOTE     Referring Provider:  Dr. Benigno Moses    Diagnosis:       ICD-10-CM ICD-9-CM    1. Grade 3 ankle sprain, left, subsequent encounter S93.402D V58.89      845.00           Orders:  Evaluate and Treat    Date of Initial Evaluation: 17    Updated orders : 17    Authorization expires: 10/27/17    Visit # 20 (3 of 8 authorized)    SUBJECTIVE:  Patient reports he played basketball yesterday and when he tried to run he felt like he was turning his ankle.  He felt it stiff for a bit and now doesn't feel anything.         Initial:  Patient reports injury on 17 while he was playing basketball.  Another player ran in front of him while he was going to shoot the ball which caused him to fall and roll his L ankle.  He was seen in the ER at [facility] due to swelling and pain and at that time he was put in a walking boot.  He states this was too painful and then was see at [facility] with a posterior splint put on the foot.  He saw orthopedics here at [facility] on 17 for follow-up.  He had a MRI done on  and again saw orthopedics on  with placement of a cast on the L LE with NWB orders due to Grade 3 ATFL, calcaneofibular and deltoid ligament tears as well as low-grade partial-thickness of posterior tibialis tendon tear.  He had the cast removed on 17 with use of boot after this.  He presents today with stiffness and states pain after getting up from sitting for long periods.  Yestreday he went to his cousins house without his boot on with just tennis shoes and had increased pain after sitting for long time.  He presents today without boot on the foot.      PAST MEDICAL HISTORY:    Past Medical History:   Diagnosis Date    Asthma        CURRENT MEDICATIONS:    Current Outpatient Prescriptions:     ALBUTEROL, REFILL, INHL, Inhale into the lungs., Disp: , Rfl:     fluticasone-salmeterol 100-50 mcg/dose (ADVAIR) 100-50 mcg/dose diskus inhaler, Inhale 1 puff into  the lungs 2 (two) times daily., Disp: , Rfl:     hyoscyamine (ANASPAZ,LEVSIN) 0.125 mg Tab, Take 1 tablet (125 mcg total) by mouth every 6 (six) hours as needed (abdominal cramping)., Disp: 15 tablet, Rfl: 0    ibuprofen (ADVIL,MOTRIN) 600 MG tablet, Take 1 tablet (600 mg total) by mouth 3 (three) times daily., Disp: 40 tablet, Rfl: 1    OBJECTIVE:  Pain: 5-6 /10, located anterior/middle ankle, described as sore/achy    Sensation: intact to light touch     Ankle ROM:  Plantar Flexion  Left  50 degrees   Right 60 degrees        Dorsi Flexion   Left 0 to 5 degrees   Right 10 degrees      Inversion   Left 20 degrees  Right 20 degrees       Eversion   Left 15 degrees  Right WFL      Strength:  Anterior tibialis   Left 4   Right 4+      Posterior tibialis  Left 4   Right 4+     Gastrocnemius  Left 4+   Right 4+      Soleus    Left 4+   Right 4+     Peroneals   Left 4+   Right 4+       Hamstrings    Left 4   Right 5-     Quadriceps   Left 4+   Right 4+     Iliopsoas   Left 4   Right 4     Gluteus Medius  Left 4+   Right 4+     Gluteus Charlie  Left 4-   Right 4      Girth:   Malleoli    Left 25.5 cm   Right 25 cm      Figure 8    Left 51 cm   Right 50.5 cm            Function:  LOWER EXTREMITY FUNCTIONAL SCALE                [facility]  8/31/17   1. Any of your usual work, housework or school activities   2/4 4/4  2. Your usual hobbies, sporting     0/4 2/4  3. Getting in and out of tub      2/4 4/4  4. Walking between rooms      2/4 4/4  5. Putting on shoes or socks      1/4 4/4  6. Squatting        0/4 1/4  7. Lifting an object from the ground      2/4 4/4  8. Performing light activities around the home   2/4  3/4  9. Performing heavy activities around the home   0/4  3/4  10. Getting in and out of car      2/4 4/4  11. Walking 2 blocks       2/4 4/4  12.Walking a mile       0/4  3/4  13. Getting up and down 1 flight of stairs    1/4 4/4  14. Standing for 1 hour      0/4 1/4  15. Sitting for an  hour       1/4  3/4  16. Running on even ground      0/4 1/4  17. Running on uneven ground     0/4 0/4  18. Making sharp turns when running fast    0/4 0/4  19. Hopping        1/4 1/4  20. Rolling over in bed       4/4 4/4          Total: 22/80 54/80         Patient reports 32.5% disability based on score of the Lower Extremity Functional Scale. (initial eval 72.5%)    Tenderness to palpation:  Anterior and medial L ankle    Other: SLS L LE 25 seconds, R LE 30 seconds     ASSESSMENT:  Patient again tolerated session well.  He continues with difficulty in hops or jumps off the L LE alone.  He was educated on importance of practicing these tasks on off days of therapy in order to build his strength.  He was also educated to perform hip strengthening as well.      Initial:  The patient is a 21 y.o. year old male who presents to physical therapy with complaints of L ankle pain.  Patient's impairments include decreased L LE strength in the ankle, knee and hip, decreased L ankle ROM, and L ankle swelling.  Patient also limited with balance and SLS time on the L LE compared to the R LE  These impairments are limiting patient's ability to participate in sports and recreational activities as well as increased standing and walking.  Patient's prognosis is good to meet the following goals.  Patient will benefit from skilled physical therapy intervention to address the above listed deficits and below listed goals to increase patient's functional mobility and stability.    Short Term Goals:    1.  Patient will be educated on HEP and report compliance 2 days per week. Met  2.  Patient will increase strength of the L LE by 1/3 MMT grade to increase functional stability. Met  3.  Patient will decrease LEFS disability to less than 55% to evidence increased functional mobility. Met   4.  Patient will have decreased swelling in the L ankle to = that of the R ankle to increase functional mobility. Met     Long Term Goals:  1.   "Patient will increase strength of the L LE by 1/2 MMT grade to increase functional stability. Partially met   2.  Patient will decrease LEFS disability to less than 40% to evidence increased functional mobility. Met   3.  Patient will increase L LE ROM to WFL to increase functional mobility. Progressing.  4.  Patient will demonstrate push-off on the L LE equal to that of the R LE for plyometric activity.      Co-morbidities which may impact the plan of care and potentially impede the patient's progress in therapy include: NA    The patient's clinical presentation is stable.    TREATMENT PROVIDED:  Ultrasound: (deferred today)  20%, 1.2 w/cm2, 3.3 mHz to the dorsum of the L ankle at the talus.     Manual therapy: (deferred today) IASTM and strumming to the L gastroc and at the talus to decrease tissue tension.  Overpressure with seated heel raise to increase toe extension to improve gait and lunge form.          Therapeutic exercise: (50 minutes)  Bike 5 minutes level 4  Gastroc stretch on slant board, 3x30" each   L Single leg heel raises, 2x20, 1 finger support  Rebounder, 3 way with L LE on foam, 4#, 20x each   Squats at chair with red band at knees, 3x10  Alt march on mini-tramp x 3 with opp SLS, 3 x 1:00  Ladder drills, 5 minutes  Hip machine, abd/flex/ext, 90#, 2x10 B LE   L LE hops 10 reps  High knee quick gait, 50 ft x 2  Backwards run, 50' x 2   Slow skip form, 50' x 2   Supine HSS with strap, 3x30" B LE   Prone quad stretch, B LE, 3x30"   Toe walk, 4x30 feet      Deferred today:   Hops on both feet, 30 times  Lunges, 20 reps B LE forward   Quick side step on balls of feet, 2x each length of the gym  Plyometric jumps, 2x10  Jumps on mini-tramp, 1 minute  Marching jumps on mini tramp, 30x   Squat jumps on mini-tramp, 5 reps  Jumps off trampoline, 2x with VC for knees IR  Wall squats, with ball, 15x  Squat jumps with red band at knees, 15x  Jog in shay, 50 ft, 4 laps  Bridges, 30x, black band at knees  Squats " "2x10 at parallel bars  Single leg heel raises on the leg press, level 7, 2x10  Pelvic tilts, 20x   BAPS board, level 2 DF/PF, inv/ever, CW/CCW, 30x each  Single leg press, 7 bands, 3x10 each LE    Squat holds at chair trials, up to 12" one rep  Ankle 4-way, blue band, 2x10  Standing hip abd/ext/flex, black band, 3x10 each B LE   Quadruped hip abd, 5x B LE  B piriformis stretch, 2x30"    Heel walk, 4x30 feet       PLAN:  Patient will benefit from physical therapy (2) x/week for (8) weeks including manual therapy, therapeutic exercise, functional activities, modalities, and patient education.    Thank you for this referral.    These services are reasonable and necessary for the conditions set forth above while under my care.    "

## 2017-09-21 ENCOUNTER — CLINICAL SUPPORT (OUTPATIENT)
Dept: REHABILITATION | Facility: HOSPITAL | Age: 22
End: 2017-09-21
Attending: ORTHOPAEDIC SURGERY
Payer: MEDICAID

## 2017-09-21 PROCEDURE — 97110 THERAPEUTIC EXERCISES: CPT | Performed by: PHYSICAL THERAPIST

## 2017-09-21 NOTE — PROGRESS NOTES
PHYSICAL THERAPY DAILY PROGRESS NOTE     Referring Provider:  Dr. Benigno Moses    Diagnosis:       ICD-10-CM ICD-9-CM    1. Grade 3 ankle sprain, left, subsequent encounter S93.402D V58.89      845.00           Orders:  Evaluate and Treat    Date of Initial Evaluation: 17    Updated orders : 17    Authorization expires: 10/27/17    Visit # 21 (4 of 8 authorized)    SUBJECTIVE:  Patient reports he has been practicing jump shots and heel raises and L LE jumps.         Initial:  Patient reports injury on 17 while he was playing basketball.  Another player ran in front of him while he was going to shoot the ball which caused him to fall and roll his L ankle.  He was seen in the ER at [facility] due to swelling and pain and at that time he was put in a walking boot.  He states this was too painful and then was see at [facility] with a posterior splint put on the foot.  He saw orthopedics here at [facility] on 17 for follow-up.  He had a MRI done on  and again saw orthopedics on  with placement of a cast on the L LE with NWB orders due to Grade 3 ATFL, calcaneofibular and deltoid ligament tears as well as low-grade partial-thickness of posterior tibialis tendon tear.  He had the cast removed on 17 with use of boot after this.  He presents today with stiffness and states pain after getting up from sitting for long periods.  Yestreday he went to his cousins house without his boot on with just tennis shoes and had increased pain after sitting for long time.  He presents today without boot on the foot.      PAST MEDICAL HISTORY:    Past Medical History:   Diagnosis Date    Asthma        CURRENT MEDICATIONS:    Current Outpatient Prescriptions:     ALBUTEROL, REFILL, INHL, Inhale into the lungs., Disp: , Rfl:     fluticasone-salmeterol 100-50 mcg/dose (ADVAIR) 100-50 mcg/dose diskus inhaler, Inhale 1 puff into the lungs 2 (two) times daily., Disp: , Rfl:     hyoscyamine (ANASPAZ,LEVSIN) 0.125 mg  Tab, Take 1 tablet (125 mcg total) by mouth every 6 (six) hours as needed (abdominal cramping)., Disp: 15 tablet, Rfl: 0    ibuprofen (ADVIL,MOTRIN) 600 MG tablet, Take 1 tablet (600 mg total) by mouth 3 (three) times daily., Disp: 40 tablet, Rfl: 1    OBJECTIVE:  Pain: 5-6 /10, located anterior/middle ankle, described as sore/achy    Sensation: intact to light touch     Ankle ROM:  Plantar Flexion  Left  50 degrees   Right 60 degrees        Dorsi Flexion   Left 0 to 5 degrees   Right 10 degrees      Inversion   Left 20 degrees  Right 20 degrees       Eversion   Left 15 degrees  Right WFL      Strength:  Anterior tibialis   Left 4   Right 4+      Posterior tibialis  Left 4   Right 4+     Gastrocnemius  Left 4+   Right 4+      Soleus    Left 4+   Right 4+     Peroneals   Left 4+   Right 4+       Hamstrings    Left 4   Right 5-     Quadriceps   Left 4+   Right 4+     Iliopsoas   Left 4   Right 4     Gluteus Medius  Left 4+   Right 4+     Gluteus Charlie  Left 4-   Right 4      Girth:   Malleoli    Left 25.5 cm   Right 25 cm      Figure 8    Left 51 cm   Right 50.5 cm            Function:  LOWER EXTREMITY FUNCTIONAL SCALE                EVAL  8/31/17   1. Any of your usual work, housework or school activities   2/4 4/4  2. Your usual hobbies, sporting     0/4 2/4  3. Getting in and out of tub      2/4 4/4  4. Walking between rooms      2/4 4/4  5. Putting on shoes or socks      1/4 4/4  6. Squatting        0/4 1/4  7. Lifting an object from the ground      2/4 4/4  8. Performing light activities around the home   2/4  3/4  9. Performing heavy activities around the home   0/4  3/4  10. Getting in and out of car      2/4 4/4  11. Walking 2 blocks       2/4 4/4  12.Walking a mile       0/4  3/4  13. Getting up and down 1 flight of stairs    1/4 4/4  14. Standing for 1 hour      0/4 1/4  15. Sitting for an hour       1/4  3/4  16. Running on even ground      0/4 1/4  17. Running on uneven  ground     0/4  0/4  18. Making sharp turns when running fast    0/4  0/4  19. Hopping        1/4 1/4  20. Rolling over in bed       4/4 4/4          Total: 22/80 54/80         Patient reports 32.5% disability based on score of the Lower Extremity Functional Scale. (initial eval 72.5%)    Tenderness to palpation:  Anterior and medial L ankle    Other: SLS L LE 25 seconds, R LE 30 seconds     ASSESSMENT:  Patient was able to perform hop on the L LE with improved speed today.  He had difficulty on jumps from B LE with the L LE leaving the ground prior to the R LE.  He was educated to increase his stretching into toe extension as he had pain with great toe extension and flexion PROM by PT.  He states at that time he broke his toe as a child but did not go to the doctor for it.        Initial:  The patient is a 21 y.o. year old male who presents to physical therapy with complaints of L ankle pain.  Patient's impairments include decreased L LE strength in the ankle, knee and hip, decreased L ankle ROM, and L ankle swelling.  Patient also limited with balance and SLS time on the L LE compared to the R LE  These impairments are limiting patient's ability to participate in sports and recreational activities as well as increased standing and walking.  Patient's prognosis is good to meet the following goals.  Patient will benefit from skilled physical therapy intervention to address the above listed deficits and below listed goals to increase patient's functional mobility and stability.    Short Term Goals:    1.  Patient will be educated on HEP and report compliance 2 days per week. Met  2.  Patient will increase strength of the L LE by 1/3 MMT grade to increase functional stability. Met  3.  Patient will decrease LEFS disability to less than 55% to evidence increased functional mobility. Met   4.  Patient will have decreased swelling in the L ankle to = that of the R ankle to increase functional mobility. Met     Long  "Term Goals:  1.  Patient will increase strength of the L LE by 1/2 MMT grade to increase functional stability. Partially met   2.  Patient will decrease LEFS disability to less than 40% to evidence increased functional mobility. Met   3.  Patient will increase L LE ROM to WFL to increase functional mobility. Progressing.  4.  Patient will demonstrate push-off on the L LE equal to that of the R LE for plyometric activity.      Co-morbidities which may impact the plan of care and potentially impede the patient's progress in therapy include: NA    The patient's clinical presentation is stable.    TREATMENT PROVIDED:  Ultrasound: (deferred today)  20%, 1.2 w/cm2, 3.3 mHz to the dorsum of the L ankle at the talus.     Manual therapy: (5 minutes - no charge)  Great toe distraction with PROM flexion and extension as well as MT splaying to increase push-off for jumps on the L LE.           Therapeutic exercise: (45 minutes)  Bike 5 minutes level 4  Gastroc stretch on slant board, 3x30" each   L Single leg heel raises, 2x20, 1 finger support - no shoes  Rebounder, 3 way with L LE on foam, 4#, 20x each - no shoes  Alt march on mini-tramp x 3 with opp SLS, 3 x 1:00 - no shoes  Ladder drills, 10 minutes  Hip machine, abd/flex/ext, 90#, 2x10 B LE   L LE hops 10 reps  Quick plyo jumps on B LE, 30 times  Toe ext stretch in standing, 5" hold, 15 reps     Deferred today:   Squats at chair with red band at knees, 3x10  High knee quick gait, 50 ft x 2  Backwards run, 50' x 2   Slow skip form, 50' x 2   Supine HSS with strap, 3x30" B LE   Prone quad stretch, B LE, 3x30"   Toe walk, 4x30 feet  Lunges, 20 reps B LE forward   Quick side step on balls of feet, 2x each length of the gym  Squat jumps with red band at knees, 15x  Jog in shay, 50 ft, 4 laps  Bridges, 30x, black band at knees  Single leg heel raises on the leg press, level 7, 2x10  Pelvic tilts, 20x   BAPS board, level 2 DF/PF, inv/ever, CW/CCW, 30x each  Single leg press, 7 " "bands, 3x10 each LE    Squat holds at chair trials, up to 12" one rep  Ankle 4-way, blue band, 2x10  Standing hip abd/ext/flex, black band, 3x10 each B LE   Quadruped hip abd, 5x B LE  B piriformis stretch, 2x30"    Heel walk, 4x30 feet       PLAN:  Patient will benefit from physical therapy (2) x/week for (8) weeks including manual therapy, therapeutic exercise, functional activities, modalities, and patient education.    Thank you for this referral.    These services are reasonable and necessary for the conditions set forth above while under my care.    "

## 2017-09-25 ENCOUNTER — CLINICAL SUPPORT (OUTPATIENT)
Dept: REHABILITATION | Facility: HOSPITAL | Age: 22
End: 2017-09-25
Attending: ORTHOPAEDIC SURGERY
Payer: MEDICAID

## 2017-09-25 PROCEDURE — 97110 THERAPEUTIC EXERCISES: CPT | Performed by: PHYSICAL THERAPIST

## 2017-09-26 NOTE — PROGRESS NOTES
PHYSICAL THERAPY DAILY PROGRESS NOTE     Referring Provider:  Dr. Benigno Moses    Diagnosis:       ICD-10-CM ICD-9-CM    1. Grade 3 ankle sprain, left, subsequent encounter S93.402D V58.89      845.00           Orders:  Evaluate and Treat    Date of Initial Evaluation: 17    Updated orders : 17    Authorization expires: 10/27/17    Visit # 22 (5 of 8 authorized)    SUBJECTIVE:  Patient reports he played a game of basketball yesterday and is sore today.  He was able to run some but still not to his full PLOF.        Initial:  Patient reports injury on 17 while he was playing basketball.  Another player ran in front of him while he was going to shoot the ball which caused him to fall and roll his L ankle.  He was seen in the ER at [facility] due to swelling and pain and at that time he was put in a walking boot.  He states this was too painful and then was see at [facility] ([place] with a posterior splint put on the foot.  He saw orthopedics here at [facility] on 17 for follow-up.  He had a MRI done on  and again saw orthopedics on  with placement of a cast on the L LE with NWB orders due to Grade 3 ATFL, calcaneofibular and deltoid ligament tears as well as low-grade partial-thickness of posterior tibialis tendon tear.  He had the cast removed on 17 with use of boot after this.  He presents today with stiffness and states pain after getting up from sitting for long periods.  Yestreday he went to his cousins house without his boot on with just tennis shoes and had increased pain after sitting for long time.  He presents today without boot on the foot.      PAST MEDICAL HISTORY:    Past Medical History:   Diagnosis Date    Asthma        CURRENT MEDICATIONS:    Current Outpatient Prescriptions:     ALBUTEROL, REFILL, INHL, Inhale into the lungs., Disp: , Rfl:     fluticasone-salmeterol 100-50 mcg/dose (ADVAIR) 100-50 mcg/dose diskus inhaler, Inhale 1 puff into the lungs 2 (two) times daily., Disp: ,  Rfl:     hyoscyamine (ANASPAZ,LEVSIN) 0.125 mg Tab, Take 1 tablet (125 mcg total) by mouth every 6 (six) hours as needed (abdominal cramping)., Disp: 15 tablet, Rfl: 0    ibuprofen (ADVIL,MOTRIN) 600 MG tablet, Take 1 tablet (600 mg total) by mouth 3 (three) times daily., Disp: 40 tablet, Rfl: 1    OBJECTIVE:  Pain: 5-6 /10, located anterior/middle ankle, described as sore/achy    Sensation: intact to light touch     Ankle ROM:  Plantar Flexion  Left  50 degrees   Right 60 degrees        Dorsi Flexion   Left 0 to 5 degrees   Right 10 degrees      Inversion   Left 20 degrees  Right 20 degrees       Eversion   Left 15 degrees  Right WFL      Strength:  Anterior tibialis   Left 4   Right 4+      Posterior tibialis  Left 4   Right 4+     Gastrocnemius  Left 4+   Right 4+      Soleus    Left 4+   Right 4+     Peroneals   Left 4+   Right 4+       Hamstrings    Left 4   Right 5-     Quadriceps   Left 4+   Right 4+     Iliopsoas   Left 4   Right 4     Gluteus Medius  Left 4+   Right 4+     Gluteus Charlie  Left 4-   Right 4      Girth:   Malleoli    Left 25.5 cm   Right 25 cm      Figure 8    Left 51 cm   Right 50.5 cm            Function:  LOWER EXTREMITY FUNCTIONAL SCALE                EVAL  8/31/17   1. Any of your usual work, housework or school activities   2/4 4/4  2. Your usual hobbies, sporting     0/4 2/4  3. Getting in and out of tub      2/4 4/4  4. Walking between rooms      2/4 4/4  5. Putting on shoes or socks      1/4 4/4  6. Squatting        0/4 1/4  7. Lifting an object from the ground      2/4 4/4  8. Performing light activities around the home   2/4  3/4  9. Performing heavy activities around the home   0/4  3/4  10. Getting in and out of car      2/4 4/4  11. Walking 2 blocks       2/4 4/4  12.Walking a mile       0/4  3/4  13. Getting up and down 1 flight of stairs    1/4 4/4  14. Standing for 1 hour      0/4 1/4  15. Sitting for an hour       1/4  3/4  16. Running on even  ground      0/4 1/4  17. Running on uneven ground     0/4  0/4  18. Making sharp turns when running fast    0/4 0/4  19. Hopping        1/4 1/4  20. Rolling over in bed       4/4 4/4          Total: 22/80 54/80         Patient reports 32.5% disability based on score of the Lower Extremity Functional Scale. (initial eval 72.5%)    Tenderness to palpation:  Anterior and medial L ankle    Other: SLS L LE 25 seconds, R LE 30 seconds     ASSESSMENT:  Patient had a clonus type beating in the L LE during toe extension stretching today.  Clonus testing was positive on 2 accounts and negative on one test.  His referring physician was notified with request for further advice moving forward in this manor.  Patient was able to perform hopping on the L LE; however, his speed is decreased than that on the R.          Initial:  The patient is a 21 y.o. year old male who presents to physical therapy with complaints of L ankle pain.  Patient's impairments include decreased L LE strength in the ankle, knee and hip, decreased L ankle ROM, and L ankle swelling.  Patient also limited with balance and SLS time on the L LE compared to the R LE  These impairments are limiting patient's ability to participate in sports and recreational activities as well as increased standing and walking.  Patient's prognosis is good to meet the following goals.  Patient will benefit from skilled physical therapy intervention to address the above listed deficits and below listed goals to increase patient's functional mobility and stability.    Short Term Goals:    1.  Patient will be educated on HEP and report compliance 2 days per week. Met  2.  Patient will increase strength of the L LE by 1/3 MMT grade to increase functional stability. Met  3.  Patient will decrease LEFS disability to less than 55% to evidence increased functional mobility. Met   4.  Patient will have decreased swelling in the L ankle to = that of the R ankle to increase functional  "mobility. Met     Long Term Goals:  1.  Patient will increase strength of the L LE by 1/2 MMT grade to increase functional stability. Partially met   2.  Patient will decrease LEFS disability to less than 40% to evidence increased functional mobility. Met   3.  Patient will increase L LE ROM to WFL to increase functional mobility. Progressing.  4.  Patient will demonstrate push-off on the L LE equal to that of the R LE for plyometric activity.      Co-morbidities which may impact the plan of care and potentially impede the patient's progress in therapy include: NA    The patient's clinical presentation is stable.    TREATMENT PROVIDED:  Ultrasound: (deferred today)  20%, 1.2 w/cm2, 3.3 mHz to the dorsum of the L ankle at the talus.     Manual therapy: (5 minutes - no charge)  Great toe distraction with PROM flexion and extension as well as MT splaying to increase push-off for jumps on the L LE.           Therapeutic exercise: (45 minutes)  Bike 5 minutes level 4  Gastroc stretch on slant board, 3x30" each   L Single leg heel raises, 2x20, 1 finger support - no shoes 1x20 - no UE   Rebounder, 3 way with L LE on foam, 4#, 30x each - no shoes  Alt march on mini-tramp x 3 with opp SLS, 3 x 1:00 - no shoes  Ladder drills, 5 minutes  Hip machine, abd/flex/ext, 90#, 2x10 B LE   Squats at chair with red band at knees, 3x10  Toe ext stretch in standing, 5" hold, 15 reps         Deferred today:   L LE hops 10 reps  Quick plyo jumps on B LE, 30 times  High knee quick gait, 50 ft x 2  Backwards run, 50' x 2   Slow skip form, 50' x 2   Supine HSS with strap, 3x30" B LE   Prone quad stretch, B LE, 3x30"   Toe walk, 4x30 feet  Lunges, 20 reps B LE forward   Quick side step on balls of feet, 2x each length of the gym  Squat jumps with red band at knees, 15x  Jog in shay, 50 ft, 4 laps  Bridges, 30x, black band at knees  Single leg heel raises on the leg press, level 7, 2x10  Pelvic tilts, 20x   BAPS board, level 2 DF/PF, inv/ever, " "CW/CCW, 30x each  Single leg press, 7 bands, 3x10 each LE    Squat holds at chair trials, up to 12" one rep  Ankle 4-way, blue band, 2x10  Standing hip abd/ext/flex, black band, 3x10 each B LE   Quadruped hip abd, 5x B LE  B piriformis stretch, 2x30"    Heel walk, 4x30 feet       PLAN:  Patient will benefit from physical therapy (2) x/week for (8) weeks including manual therapy, therapeutic exercise, functional activities, modalities, and patient education.    Thank you for this referral.    These services are reasonable and necessary for the conditions set forth above while under my care.    "

## 2017-09-27 ENCOUNTER — CLINICAL SUPPORT (OUTPATIENT)
Dept: REHABILITATION | Facility: HOSPITAL | Age: 22
End: 2017-09-27
Attending: ORTHOPAEDIC SURGERY
Payer: MEDICAID

## 2017-09-27 PROCEDURE — 97110 THERAPEUTIC EXERCISES: CPT | Performed by: PHYSICAL THERAPIST

## 2017-09-27 NOTE — PLAN OF CARE
PHYSICAL THERAPY DAILY PROGRESS NOTE     Referring Provider:  Dr. Benigno Moses    Diagnosis:       ICD-10-CM ICD-9-CM    1. Grade 3 ankle sprain, left, subsequent encounter S93.402D V58.89      845.00           Orders:  Evaluate and Treat    Date of Initial Evaluation: 17    Updated orders : 17    Authorization expires: 10/27/17    Visit # 23 (6 of 8 authorized)    SUBJECTIVE:  Patient reports his L calf muscle is sore today.  He states he was running yesterday and felt a little faster.          Initial:  Patient reports injury on 17 while he was playing basketball.  Another player ran in front of him while he was going to shoot the ball which caused him to fall and roll his L ankle.  He was seen in the ER at [facility] due to swelling and pain and at that time he was put in a walking boot.  He states this was too painful and then was see at [facility] with a posterior splint put on the foot.  He saw orthopedics here at [facility] on 17 for follow-up.  He had a MRI done on  and again saw orthopedics on  with placement of a cast on the L LE with NWB orders due to Grade 3 ATFL, calcaneofibular and deltoid ligament tears as well as low-grade partial-thickness of posterior tibialis tendon tear.  He had the cast removed on 17 with use of boot after this.  He presents today with stiffness and states pain after getting up from sitting for long periods.  Yestreday he went to his cousins house without his boot on with just tennis shoes and had increased pain after sitting for long time.  He presents today without boot on the foot.      PAST MEDICAL HISTORY:    Past Medical History:   Diagnosis Date    Asthma        CURRENT MEDICATIONS:    Current Outpatient Prescriptions:     ALBUTEROL, REFILL, INHL, Inhale into the lungs., Disp: , Rfl:     fluticasone-salmeterol 100-50 mcg/dose (ADVAIR) 100-50 mcg/dose diskus inhaler, Inhale 1 puff into the lungs 2 (two) times daily., Disp: , Rfl:      hyoscyamine (ANASPAZ,LEVSIN) 0.125 mg Tab, Take 1 tablet (125 mcg total) by mouth every 6 (six) hours as needed (abdominal cramping)., Disp: 15 tablet, Rfl: 0    ibuprofen (ADVIL,MOTRIN) 600 MG tablet, Take 1 tablet (600 mg total) by mouth 3 (three) times daily., Disp: 40 tablet, Rfl: 1    OBJECTIVE:  Pain: 5-6 /10, located anterior/middle ankle, described as sore/achy    Sensation: intact to light touch     Ankle ROM:  Plantar Flexion  Left  50 degrees   Right 60 degrees        Dorsi Flexion   Left 0 to 5 degrees   Right 10 degrees      Inversion   Left 20 degrees  Right 20 degrees       Eversion   Left 15 degrees  Right WFL      Strength:  Anterior tibialis   Left 4   Right 4+      Posterior tibialis  Left 4   Right 4+     Gastrocnemius  Left 4+   Right 4+      Soleus    Left 4+   Right 4+     Peroneals   Left 4+   Right 4+       Hamstrings    Left 4   Right 5-     Quadriceps   Left 4+   Right 4+     Iliopsoas   Left 4   Right 4     Gluteus Medius  Left 4+   Right 4+     Gluteus Charlie  Left 4-   Right 4      Girth:   Malleoli    Left 25.5 cm   Right 25 cm      Figure 8    Left 51 cm   Right 50.5 cm            Function:  LOWER EXTREMITY FUNCTIONAL SCALE                EVAL  8/31/17   1. Any of your usual work, housework or school activities   2/4 4/4  2. Your usual hobbies, sporting     0/4 2/4  3. Getting in and out of tub      2/4 4/4  4. Walking between rooms      2/4 4/4  5. Putting on shoes or socks      1/4 4/4  6. Squatting        0/4 1/4  7. Lifting an object from the ground      2/4 4/4  8. Performing light activities around the home   2/4  3/4  9. Performing heavy activities around the home   0/4  3/4  10. Getting in and out of car      2/4 4/4  11. Walking 2 blocks       2/4 4/4  12.Walking a mile       0/4  3/4  13. Getting up and down 1 flight of stairs    1/4 4/4  14. Standing for 1 hour      0/4 1/4  15. Sitting for an hour       1/4  3/4  16. Running on even ground      0/4 1/4  17.  Running on uneven ground     0/4  0/4  18. Making sharp turns when running fast    0/4  0/4  19. Hopping        1/4 1/4  20. Rolling over in bed       4/4 4/4          Total: 22/80 54/80         Patient reports 32.5% disability based on score of the Lower Extremity Functional Scale. (initial eval 72.5%)    Tenderness to palpation:  Anterior and medial L ankle    Other: SLS L LE 25 seconds, R LE 30 seconds     ASSESSMENT:  Patient was re-tested today for the clonus test with negative results.  He also did not have the beating in the foot with the toe extension stretching today.  He was able to increase his height on jumps on the ladder drills today.  He was educated on continued hip weakness which is leading to difficulty in running.  Continued PT recommended to further progress functional running and sport activities.    Initial:  The patient is a 21 y.o. year old male who presents to physical therapy with complaints of L ankle pain.  Patient's impairments include decreased L LE strength in the ankle, knee and hip, decreased L ankle ROM, and L ankle swelling.  Patient also limited with balance and SLS time on the L LE compared to the R LE  These impairments are limiting patient's ability to participate in sports and recreational activities as well as increased standing and walking.  Patient's prognosis is good to meet the following goals.  Patient will benefit from skilled physical therapy intervention to address the above listed deficits and below listed goals to increase patient's functional mobility and stability.    Short Term Goals:    1.  Patient will be educated on HEP and report compliance 2 days per week. Met  2.  Patient will increase strength of the L LE by 1/3 MMT grade to increase functional stability. Met  3.  Patient will decrease LEFS disability to less than 55% to evidence increased functional mobility. Met   4.  Patient will have decreased swelling in the L ankle to = that of the R ankle to  "increase functional mobility. Met     Long Term Goals:  1.  Patient will increase strength of the L LE by 1/2 MMT grade to increase functional stability. Partially met   2.  Patient will decrease LEFS disability to less than 40% to evidence increased functional mobility. Met   3.  Patient will increase L LE ROM to WFL to increase functional mobility. Progressing.  4.  Patient will demonstrate push-off on the L LE equal to that of the R LE for plyometric activity.      Co-morbidities which may impact the plan of care and potentially impede the patient's progress in therapy include: NA    The patient's clinical presentation is stable.    TREATMENT PROVIDED:  Ultrasound: (deferred today)  20%, 1.2 w/cm2, 3.3 mHz to the dorsum of the L ankle at the talus.     Manual therapy: (deferred today)  Great toe distraction with PROM flexion and extension as well as MT splaying to increase push-off for jumps on the L LE.           Therapeutic exercise: (55 minutes)  Bike 5 minutes level 4  Supine HSS with strap, 3x30" B LE   Prone quad stretch, B LE, 3x30"   Gastroc stretch on slant board, 3x30" each   L Single leg heel raises, 2x20, no shoes  no UE   Rebounder, 3 way with L LE on foam, 4#, 30x each - no shoes  Alt march on mini-tramp x 3 with opp SLS, 3 x 1:00 - no shoes  Ladder drills, 8 minutes  Hip machine, abd/flex/ext, 90#, 2x10 B LE   Toe ext stretch in standing, 5" hold, 15 reps   Running, 8 lengths of gym  Side squats with blue band at knees, 4 lengths each direction of gym       Deferred today:   Squats at chair with red band at knees, 3x10  L LE hops 10 reps  Quick plyo jumps on B LE, 30 times  High knee quick gait, 50 ft x 2  Backwards run, 50' x 2   Slow skip form, 50' x 2   Toe walk, 4x30 feet  Lunges, 20 reps B LE forward   Quick side step on balls of feet, 2x each length of the gym  Squat jumps with red band at knees, 15x  Jog in shay, 50 ft, 4 laps  Bridges, 30x, black band at knees  Single leg heel raises on the " "leg press, level 7, 2x10  Pelvic tilts, 20x   BAPS board, level 2 DF/PF, inv/ever, CW/CCW, 30x each  Single leg press, 7 bands, 3x10 each LE    Squat holds at chair trials, up to 12" one rep  Ankle 4-way, blue band, 2x10  Standing hip abd/ext/flex, black band, 3x10 each B LE   Quadruped hip abd, 5x B LE  B piriformis stretch, 2x30"    Heel walk, 4x30 feet       PLAN:  Patient will benefit from physical therapy (2) x/week for (8) weeks including manual therapy, therapeutic exercise, functional activities, modalities, and patient education.    Thank you for this referral.    These services are reasonable and necessary for the conditions set forth above while under my care.    "

## 2017-09-27 NOTE — PROGRESS NOTES
PHYSICAL THERAPY DAILY PROGRESS NOTE     Referring Provider:  Dr. Benigno Moses    Diagnosis:       ICD-10-CM ICD-9-CM    1. Grade 3 ankle sprain, left, subsequent encounter S93.402D V58.89      845.00           Orders:  Evaluate and Treat    Date of Initial Evaluation: 17    Updated orders : 17    Authorization expires: 10/27/17    Visit # 23 (6 of 8 authorized)    SUBJECTIVE:  Patient reports his L calf muscle is sore today.  He states he was running yesterday and felt a little faster.          Initial:  Patient reports injury on 17 while he was playing basketball.  Another player ran in front of him while he was going to shoot the ball which caused him to fall and roll his L ankle.  He was seen in the ER at [facility] due to swelling and pain and at that time he was put in a walking boot.  He states this was too painful and then was see at [facility] with a posterior splint put on the foot.  He saw orthopedics here at [facility] on 17 for follow-up.  He had a MRI done on  and again saw orthopedics on  with placement of a cast on the L LE with NWB orders due to Grade 3 ATFL, calcaneofibular and deltoid ligament tears as well as low-grade partial-thickness of posterior tibialis tendon tear.  He had the cast removed on 17 with use of boot after this.  He presents today with stiffness and states pain after getting up from sitting for long periods.  Yestreday he went to his cousins house without his boot on with just tennis shoes and had increased pain after sitting for long time.  He presents today without boot on the foot.      PAST MEDICAL HISTORY:    Past Medical History:   Diagnosis Date    Asthma        CURRENT MEDICATIONS:    Current Outpatient Prescriptions:     ALBUTEROL, REFILL, INHL, Inhale into the lungs., Disp: , Rfl:     fluticasone-salmeterol 100-50 mcg/dose (ADVAIR) 100-50 mcg/dose diskus inhaler, Inhale 1 puff into the lungs 2 (two) times daily., Disp: , Rfl:      hyoscyamine (ANASPAZ,LEVSIN) 0.125 mg Tab, Take 1 tablet (125 mcg total) by mouth every 6 (six) hours as needed (abdominal cramping)., Disp: 15 tablet, Rfl: 0    ibuprofen (ADVIL,MOTRIN) 600 MG tablet, Take 1 tablet (600 mg total) by mouth 3 (three) times daily., Disp: 40 tablet, Rfl: 1    OBJECTIVE:  Pain: 5-6 /10, located anterior/middle ankle, described as sore/achy    Sensation: intact to light touch     Ankle ROM:  Plantar Flexion  Left  50 degrees   Right 60 degrees        Dorsi Flexion   Left 0 to 5 degrees   Right 10 degrees      Inversion   Left 20 degrees  Right 20 degrees       Eversion   Left 15 degrees  Right WFL      Strength:  Anterior tibialis   Left 4   Right 4+      Posterior tibialis  Left 4   Right 4+     Gastrocnemius  Left 4+   Right 4+      Soleus    Left 4+   Right 4+     Peroneals   Left 4+   Right 4+       Hamstrings    Left 4   Right 5-     Quadriceps   Left 4+   Right 4+     Iliopsoas   Left 4   Right 4     Gluteus Medius  Left 4+   Right 4+     Gluteus Charlie  Left 4-   Right 4      Girth:   Malleoli    Left 25.5 cm   Right 25 cm      Figure 8    Left 51 cm   Right 50.5 cm            Function:  LOWER EXTREMITY FUNCTIONAL SCALE                EVAL  8/31/17   1. Any of your usual work, housework or school activities   2/4 4/4  2. Your usual hobbies, sporting     0/4 2/4  3. Getting in and out of tub      2/4 4/4  4. Walking between rooms      2/4 4/4  5. Putting on shoes or socks      1/4 4/4  6. Squatting        0/4 1/4  7. Lifting an object from the ground      2/4 4/4  8. Performing light activities around the home   2/4  3/4  9. Performing heavy activities around the home   0/4  3/4  10. Getting in and out of car      2/4 4/4  11. Walking 2 blocks       2/4 4/4  12.Walking a mile       0/4  3/4  13. Getting up and down 1 flight of stairs    1/4 4/4  14. Standing for 1 hour      0/4 1/4  15. Sitting for an hour       1/4  3/4  16. Running on even ground      0/4 1/4  17.  Running on uneven ground     0/4  0/4  18. Making sharp turns when running fast    0/4  0/4  19. Hopping        1/4 1/4  20. Rolling over in bed       4/4 4/4          Total: 22/80 54/80         Patient reports 32.5% disability based on score of the Lower Extremity Functional Scale. (initial eval 72.5%)    Tenderness to palpation:  Anterior and medial L ankle    Other: SLS L LE 25 seconds, R LE 30 seconds     ASSESSMENT:  Patient was re-tested today for the clonus test with negative results.  He also did not have the beating in the foot with the toe extension stretching today.  He was able to increase his height on jumps on the ladder drills today.  He was educated on continued hip weakness which is leading to difficulty in running.  Continued PT recommended to further progress functional running and sport activities.    Initial:  The patient is a 21 y.o. year old male who presents to physical therapy with complaints of L ankle pain.  Patient's impairments include decreased L LE strength in the ankle, knee and hip, decreased L ankle ROM, and L ankle swelling.  Patient also limited with balance and SLS time on the L LE compared to the R LE  These impairments are limiting patient's ability to participate in sports and recreational activities as well as increased standing and walking.  Patient's prognosis is good to meet the following goals.  Patient will benefit from skilled physical therapy intervention to address the above listed deficits and below listed goals to increase patient's functional mobility and stability.    Short Term Goals:    1.  Patient will be educated on HEP and report compliance 2 days per week. Met  2.  Patient will increase strength of the L LE by 1/3 MMT grade to increase functional stability. Met  3.  Patient will decrease LEFS disability to less than 55% to evidence increased functional mobility. Met   4.  Patient will have decreased swelling in the L ankle to = that of the R ankle to  "increase functional mobility. Met     Long Term Goals:  1.  Patient will increase strength of the L LE by 1/2 MMT grade to increase functional stability. Partially met   2.  Patient will decrease LEFS disability to less than 40% to evidence increased functional mobility. Met   3.  Patient will increase L LE ROM to WFL to increase functional mobility. Progressing.  4.  Patient will demonstrate push-off on the L LE equal to that of the R LE for plyometric activity.      Co-morbidities which may impact the plan of care and potentially impede the patient's progress in therapy include: NA    The patient's clinical presentation is stable.    TREATMENT PROVIDED:  Ultrasound: (deferred today)  20%, 1.2 w/cm2, 3.3 mHz to the dorsum of the L ankle at the talus.     Manual therapy: (deferred today)  Great toe distraction with PROM flexion and extension as well as MT splaying to increase push-off for jumps on the L LE.           Therapeutic exercise: (55 minutes)  Bike 5 minutes level 4  Supine HSS with strap, 3x30" B LE   Prone quad stretch, B LE, 3x30"   Gastroc stretch on slant board, 3x30" each   L Single leg heel raises, 2x20, no shoes  no UE   Rebounder, 3 way with L LE on foam, 4#, 30x each - no shoes  Alt march on mini-tramp x 3 with opp SLS, 3 x 1:00 - no shoes  Ladder drills, 8 minutes  Hip machine, abd/flex/ext, 90#, 2x10 B LE   Toe ext stretch in standing, 5" hold, 15 reps   Running, 8 lengths of gym  Side squats with blue band at knees, 4 lengths each direction of gym       Deferred today:   Squats at chair with red band at knees, 3x10  L LE hops 10 reps  Quick plyo jumps on B LE, 30 times  High knee quick gait, 50 ft x 2  Backwards run, 50' x 2   Slow skip form, 50' x 2   Toe walk, 4x30 feet  Lunges, 20 reps B LE forward   Quick side step on balls of feet, 2x each length of the gym  Squat jumps with red band at knees, 15x  Jog in shay, 50 ft, 4 laps  Bridges, 30x, black band at knees  Single leg heel raises on the " "leg press, level 7, 2x10  Pelvic tilts, 20x   BAPS board, level 2 DF/PF, inv/ever, CW/CCW, 30x each  Single leg press, 7 bands, 3x10 each LE    Squat holds at chair trials, up to 12" one rep  Ankle 4-way, blue band, 2x10  Standing hip abd/ext/flex, black band, 3x10 each B LE   Quadruped hip abd, 5x B LE  B piriformis stretch, 2x30"    Heel walk, 4x30 feet       PLAN:  Patient will benefit from physical therapy (2) x/week for (8) weeks including manual therapy, therapeutic exercise, functional activities, modalities, and patient education.    Thank you for this referral.    These services are reasonable and necessary for the conditions set forth above while under my care.    "

## 2017-10-02 ENCOUNTER — CLINICAL SUPPORT (OUTPATIENT)
Dept: REHABILITATION | Facility: HOSPITAL | Age: 22
End: 2017-10-02
Attending: ORTHOPAEDIC SURGERY
Payer: MEDICAID

## 2017-10-02 PROCEDURE — 97110 THERAPEUTIC EXERCISES: CPT | Performed by: PHYSICAL THERAPIST

## 2017-10-02 PROCEDURE — 97035 APP MDLTY 1+ULTRASOUND EA 15: CPT | Performed by: PHYSICAL THERAPIST

## 2017-10-03 NOTE — PLAN OF CARE
PHYSICAL THERAPY DAILY PROGRESS NOTE     Referring Provider:  Dr. Benigno Moses    Diagnosis:       ICD-10-CM ICD-9-CM    1. Grade 3 ankle sprain, left, subsequent encounter S93.402D V58.89      845.00           Orders:  Evaluate and Treat    Date of Initial Evaluation: 17    Updated orders : 17    Authorization expires: 10/27/17    Visit # 24 (7 of 8 authorized)    SUBJECTIVE:  Patient reports to therapy tech that he was playing basketball and someone undercut him causing him to catch himself on the R foot.  He did not mention any stepping or issues with the L foot.  He states he is still unable to run and cut like he used to before he hurt his ankle.      Initial:  Patient reports injury on 17 while he was playing basketball.  Another player ran in front of him while he was going to shoot the ball which caused him to fall and roll his L ankle.  He was seen in the ER at [facility] due to swelling and pain and at that time he was put in a walking boot.  He states this was too painful and then was see at [facility] with a posterior splint put on the foot.  He saw orthopedics here at [facility] on 17 for follow-up.  He had a MRI done on  and again saw orthopedics on  with placement of a cast on the L LE with NWB orders due to Grade 3 ATFL, calcaneofibular and deltoid ligament tears as well as low-grade partial-thickness of posterior tibialis tendon tear.  He had the cast removed on 17 with use of boot after this.  He presents today with stiffness and states pain after getting up from sitting for long periods.  Yestreday he went to his cousins house without his boot on with just tennis shoes and had increased pain after sitting for long time.  He presents today without boot on the foot.      PAST MEDICAL HISTORY:    Past Medical History:   Diagnosis Date    Asthma        CURRENT MEDICATIONS:    Current Outpatient Prescriptions:     ALBUTEROL, REFILL, INHL, Inhale into the lungs., Disp: , Rfl:      fluticasone-salmeterol 100-50 mcg/dose (ADVAIR) 100-50 mcg/dose diskus inhaler, Inhale 1 puff into the lungs 2 (two) times daily., Disp: , Rfl:     hyoscyamine (ANASPAZ,LEVSIN) 0.125 mg Tab, Take 1 tablet (125 mcg total) by mouth every 6 (six) hours as needed (abdominal cramping)., Disp: 15 tablet, Rfl: 0    ibuprofen (ADVIL,MOTRIN) 600 MG tablet, Take 1 tablet (600 mg total) by mouth 3 (three) times daily., Disp: 40 tablet, Rfl: 1    OBJECTIVE:  Pain: 5-6 /10, located anterior/middle ankle, described as sore/achy    Sensation: intact to light touch     Ankle ROM:  Plantar Flexion  Left  50 degrees   Right 60 degrees        Dorsi Flexion   Left 0 to 5 degrees   Right 10 degrees      Inversion   Left 20 degrees  Right 20 degrees       Eversion   Left 15 degrees  Right WFL      Strength:  Anterior tibialis   Left 4   Right 4+      Posterior tibialis  Left 4   Right 4+     Gastrocnemius  Left 4+   Right 4+      Soleus    Left 4+   Right 4+     Peroneals   Left 4+   Right 4+       Hamstrings    Left 4   Right 5-     Quadriceps   Left 4+   Right 4+     Iliopsoas   Left 4   Right 4     Gluteus Medius  Left 4+   Right 4+     Gluteus Charlie  Left 4-   Right 4      Girth:   Malleoli    Left 25.5 cm   Right 25 cm      Figure 8    Left 51 cm   Right 50.5 cm            Function:  LOWER EXTREMITY FUNCTIONAL SCALE                EVAL  8/31/17   1. Any of your usual work, housework or school activities   2/4 4/4  2. Your usual hobbies, sporting     0/4 2/4  3. Getting in and out of tub      2/4 4/4  4. Walking between rooms      2/4 4/4  5. Putting on shoes or socks      1/4 4/4  6. Squatting        0/4 1/4  7. Lifting an object from the ground      2/4 4/4  8. Performing light activities around the home   2/4  3/4  9. Performing heavy activities around the home   0/4  3/4  10. Getting in and out of car      2/4 4/4  11. Walking 2 blocks       2/4 4/4  12.Walking a mile       0/4  3/4  13. Getting up and down 1  flight of stairs    1/4 4/4  14. Standing for 1 hour      0/4 1/4  15. Sitting for an hour       1/4  3/4  16. Running on even ground      0/4 1/4  17. Running on uneven ground     0/4 0/4  18. Making sharp turns when running fast    0/4 0/4  19. Hopping        1/4 1/4  20. Rolling over in bed       4/4 4/4          Total: 22/80 54/80         Patient reports 32.5% disability based on score of the Lower Extremity Functional Scale. (initial eval 72.5%)    Tenderness to palpation:  Anterior and medial L ankle    Other: SLS L LE 25 seconds, R LE 30 seconds     ASSESSMENT:  Patient had difficulty with landing today on the L LE.  He was able to land on the toes; however, pressure on the heel caused him discomfort.  Patient was educated on self stretching into inversion and eversion as he demonstrated some discomfort with the medial ankle in these motions.  Continued skilled PT services recommended to address the pain and full jumping deficits he is exhibiting to progress him to     Initial:  The patient is a 21 y.o. year old male who presents to physical therapy with complaints of L ankle pain.  Patient's impairments include decreased L LE strength in the ankle, knee and hip, decreased L ankle ROM, and L ankle swelling.  Patient also limited with balance and SLS time on the L LE compared to the R LE  These impairments are limiting patient's ability to participate in sports and recreational activities as well as increased standing and walking.  Patient's prognosis is good to meet the following goals.  Patient will benefit from skilled physical therapy intervention to address the above listed deficits and below listed goals to increase patient's functional mobility and stability.    Short Term Goals:    1.  Patient will be educated on HEP and report compliance 2 days per week. Met  2.  Patient will increase strength of the L LE by 1/3 MMT grade to increase functional stability. Met  3.  Patient will decrease LEFS  "disability to less than 55% to evidence increased functional mobility. Met   4.  Patient will have decreased swelling in the L ankle to = that of the R ankle to increase functional mobility. Met     Long Term Goals:  1.  Patient will increase strength of the L LE by 1/2 MMT grade to increase functional stability. Partially met   2.  Patient will decrease LEFS disability to less than 40% to evidence increased functional mobility. Met   3.  Patient will increase L LE ROM to WFL to increase functional mobility. Progressing.  4.  Patient will demonstrate push-off on the L LE equal to that of the R LE for plyometric activity.      Co-morbidities which may impact the plan of care and potentially impede the patient's progress in therapy include: NA    The patient's clinical presentation is stable.    TREATMENT PROVIDED:  Ultrasound: (8 minutes)  20%, 1.2 w/cm2, 3.3 mHz to the dorsum of the L medial ankle.     Manual therapy: (5 minutes - no charge )  IASTM and strumming to the  L medial ankle around the malleolus.            Therapeutic exercise: (40 minutes)  Bike 5 minutes level 4  Gastroc stretch on slant board, 3x30" each   L Single leg heel raises, 2x20, no shoes  no UE   Rebounder, 3 way with L LE on foam, 4#, 30x each - no shoes  Alt march on mini-tramp x 3 with opp SLS, 3 x 1:00 - no shoes  Ladder drills, 8 minutes  BAPS board, level 3 CW/CCW, 1:00 each direction with 3# at 4 pegs   Band assisted heel raises B LE, 20 reps, L LE only 20 reps       Deferred today:   Supine HSS with strap, 3x30" B LE   Prone quad stretch, B LE, 3x30"   Hip machine, abd/flex/ext, 90#, 2x10 B LE   Toe ext stretch in standing, 5" hold, 15 reps   Running, 8 lengths of gym  Side squats with blue band at knees, 4 lengths each direction of gym   Squats at chair with red band at knees, 3x10  L LE hops 10 reps  Quick plyo jumps on B LE, 30 times  High knee quick gait, 50 ft x 2  Backwards run, 50' x 2   Slow skip form, 50' x 2   Toe walk, 4x30 " "feet  Lunges, 20 reps B LE forward   Quick side step on balls of feet, 2x each length of the gym  Squat jumps with red band at knees, 15x  Jog in shay, 50 ft, 4 laps  Bridges, 30x, black band at knees  Single leg heel raises on the leg press, level 7, 2x10  Pelvic tilts, 20x   Single leg press, 7 bands, 3x10 each LE    Squat holds at chair trials, up to 12" one rep  Ankle 4-way, blue band, 2x10  Standing hip abd/ext/flex, black band, 3x10 each B LE   Quadruped hip abd, 5x B LE  B piriformis stretch, 2x30"    Heel walk, 4x30 feet       PLAN:  Patient will benefit from physical therapy (2) x/week for (8) weeks including manual therapy, therapeutic exercise, functional activities, modalities, and patient education.    Thank you for this referral.    These services are reasonable and necessary for the conditions set forth above while under my care.  "

## 2017-10-03 NOTE — PROGRESS NOTES
PHYSICAL THERAPY DAILY PROGRESS NOTE     Referring Provider:  Dr. Benigno Moses    Diagnosis:       ICD-10-CM ICD-9-CM    1. Grade 3 ankle sprain, left, subsequent encounter S93.402D V58.89      845.00           Orders:  Evaluate and Treat    Date of Initial Evaluation: 17    Updated orders : 17    Authorization expires: 10/27/17    Visit # 24 (7 of 8 authorized)    SUBJECTIVE:  Patient reports to therapy tech that he was playing basketball and someone undercut him causing him to catch himself on the R foot.  He did not mention any stepping or issues with the L foot.  He states he is still unable to run and cut like he used to before he hurt his ankle.      Initial:  Patient reports injury on 17 while he was playing basketball.  Another player ran in front of him while he was going to shoot the ball which caused him to fall and roll his L ankle.  He was seen in the ER at [facility] due to swelling and pain and at that time he was put in a walking boot.  He states this was too painful and then was see at [facility] with a posterior splint put on the foot.  He saw orthopedics here at [facility] on 17 for follow-up.  He had a MRI done on  and again saw orthopedics on  with placement of a cast on the L LE with NWB orders due to Grade 3 ATFL, calcaneofibular and deltoid ligament tears as well as low-grade partial-thickness of posterior tibialis tendon tear.  He had the cast removed on 17 with use of boot after this.  He presents today with stiffness and states pain after getting up from sitting for long periods.  Yestreday he went to his cousins house without his boot on with just tennis shoes and had increased pain after sitting for long time.  He presents today without boot on the foot.      PAST MEDICAL HISTORY:    Past Medical History:   Diagnosis Date    Asthma        CURRENT MEDICATIONS:    Current Outpatient Prescriptions:     ALBUTEROL, REFILL, INHL, Inhale into the lungs., Disp: , Rfl:      fluticasone-salmeterol 100-50 mcg/dose (ADVAIR) 100-50 mcg/dose diskus inhaler, Inhale 1 puff into the lungs 2 (two) times daily., Disp: , Rfl:     hyoscyamine (ANASPAZ,LEVSIN) 0.125 mg Tab, Take 1 tablet (125 mcg total) by mouth every 6 (six) hours as needed (abdominal cramping)., Disp: 15 tablet, Rfl: 0    ibuprofen (ADVIL,MOTRIN) 600 MG tablet, Take 1 tablet (600 mg total) by mouth 3 (three) times daily., Disp: 40 tablet, Rfl: 1    OBJECTIVE:  Pain: 5-6 /10, located anterior/middle ankle, described as sore/achy    Sensation: intact to light touch     Ankle ROM:  Plantar Flexion  Left  50 degrees   Right 60 degrees        Dorsi Flexion   Left 0 to 5 degrees   Right 10 degrees      Inversion   Left 20 degrees  Right 20 degrees       Eversion   Left 15 degrees  Right WFL      Strength:  Anterior tibialis   Left 4   Right 4+      Posterior tibialis  Left 4   Right 4+     Gastrocnemius  Left 4+   Right 4+      Soleus    Left 4+   Right 4+     Peroneals   Left 4+   Right 4+       Hamstrings    Left 4   Right 5-     Quadriceps   Left 4+   Right 4+     Iliopsoas   Left 4   Right 4     Gluteus Medius  Left 4+   Right 4+     Gluteus Charlie  Left 4-   Right 4      Girth:   Malleoli    Left 25.5 cm   Right 25 cm      Figure 8    Left 51 cm   Right 50.5 cm            Function:  LOWER EXTREMITY FUNCTIONAL SCALE                EVAL  8/31/17   1. Any of your usual work, housework or school activities   2/4 4/4  2. Your usual hobbies, sporting     0/4 2/4  3. Getting in and out of tub      2/4 4/4  4. Walking between rooms      2/4 4/4  5. Putting on shoes or socks      1/4 4/4  6. Squatting        0/4 1/4  7. Lifting an object from the ground      2/4 4/4  8. Performing light activities around the home   2/4  3/4  9. Performing heavy activities around the home   0/4  3/4  10. Getting in and out of car      2/4 4/4  11. Walking 2 blocks       2/4 4/4  12.Walking a mile       0/4  3/4  13. Getting up and down 1  flight of stairs    1/4 4/4  14. Standing for 1 hour      0/4 1/4  15. Sitting for an hour       1/4  3/4  16. Running on even ground      0/4 1/4  17. Running on uneven ground     0/4 0/4  18. Making sharp turns when running fast    0/4 0/4  19. Hopping        1/4 1/4  20. Rolling over in bed       4/4 4/4          Total: 22/80 54/80         Patient reports 32.5% disability based on score of the Lower Extremity Functional Scale. (initial eval 72.5%)    Tenderness to palpation:  Anterior and medial L ankle    Other: SLS L LE 25 seconds, R LE 30 seconds     ASSESSMENT:  Patient had difficulty with landing today on the L LE.  He was able to land on the toes; however, pressure on the heel caused him discomfort.  Patient was educated on self stretching into inversion and eversion as he demonstrated some discomfort with the medial ankle in these motions.  Continued skilled PT services recommended to address the pain and full jumping deficits he is exhibiting to progress him to     Initial:  The patient is a 21 y.o. year old male who presents to physical therapy with complaints of L ankle pain.  Patient's impairments include decreased L LE strength in the ankle, knee and hip, decreased L ankle ROM, and L ankle swelling.  Patient also limited with balance and SLS time on the L LE compared to the R LE  These impairments are limiting patient's ability to participate in sports and recreational activities as well as increased standing and walking.  Patient's prognosis is good to meet the following goals.  Patient will benefit from skilled physical therapy intervention to address the above listed deficits and below listed goals to increase patient's functional mobility and stability.    Short Term Goals:    1.  Patient will be educated on HEP and report compliance 2 days per week. Met  2.  Patient will increase strength of the L LE by 1/3 MMT grade to increase functional stability. Met  3.  Patient will decrease LEFS  "disability to less than 55% to evidence increased functional mobility. Met   4.  Patient will have decreased swelling in the L ankle to = that of the R ankle to increase functional mobility. Met     Long Term Goals:  1.  Patient will increase strength of the L LE by 1/2 MMT grade to increase functional stability. Partially met   2.  Patient will decrease LEFS disability to less than 40% to evidence increased functional mobility. Met   3.  Patient will increase L LE ROM to WFL to increase functional mobility. Progressing.  4.  Patient will demonstrate push-off on the L LE equal to that of the R LE for plyometric activity.      Co-morbidities which may impact the plan of care and potentially impede the patient's progress in therapy include: NA    The patient's clinical presentation is stable.    TREATMENT PROVIDED:  Ultrasound: (8 minutes)  20%, 1.2 w/cm2, 3.3 mHz to the dorsum of the L medial ankle.     Manual therapy: (5 minutes - no charge )  IASTM and strumming to the  L medial ankle around the malleolus.            Therapeutic exercise: (40 minutes)  Bike 5 minutes level 4  Gastroc stretch on slant board, 3x30" each   L Single leg heel raises, 2x20, no shoes  no UE   Rebounder, 3 way with L LE on foam, 4#, 30x each - no shoes  Alt march on mini-tramp x 3 with opp SLS, 3 x 1:00 - no shoes  Ladder drills, 8 minutes  BAPS board, level 3 CW/CCW, 1:00 each direction with 3# at 4 pegs   Band assisted heel raises B LE, 20 reps, L LE only 20 reps       Deferred today:   Supine HSS with strap, 3x30" B LE   Prone quad stretch, B LE, 3x30"   Hip machine, abd/flex/ext, 90#, 2x10 B LE   Toe ext stretch in standing, 5" hold, 15 reps   Running, 8 lengths of gym  Side squats with blue band at knees, 4 lengths each direction of gym   Squats at chair with red band at knees, 3x10  L LE hops 10 reps  Quick plyo jumps on B LE, 30 times  High knee quick gait, 50 ft x 2  Backwards run, 50' x 2   Slow skip form, 50' x 2   Toe walk, 4x30 " "feet  Lunges, 20 reps B LE forward   Quick side step on balls of feet, 2x each length of the gym  Squat jumps with red band at knees, 15x  Jog in shay, 50 ft, 4 laps  Bridges, 30x, black band at knees  Single leg heel raises on the leg press, level 7, 2x10  Pelvic tilts, 20x   Single leg press, 7 bands, 3x10 each LE    Squat holds at chair trials, up to 12" one rep  Ankle 4-way, blue band, 2x10  Standing hip abd/ext/flex, black band, 3x10 each B LE   Quadruped hip abd, 5x B LE  B piriformis stretch, 2x30"    Heel walk, 4x30 feet       PLAN:  Patient will benefit from physical therapy (2) x/week for (8) weeks including manual therapy, therapeutic exercise, functional activities, modalities, and patient education.    Thank you for this referral.    These services are reasonable and necessary for the conditions set forth above while under my care.    "